# Patient Record
Sex: FEMALE | Race: WHITE | Employment: OTHER | URBAN - METROPOLITAN AREA
[De-identification: names, ages, dates, MRNs, and addresses within clinical notes are randomized per-mention and may not be internally consistent; named-entity substitution may affect disease eponyms.]

---

## 2017-01-17 PROCEDURE — 82787 IGG 1 2 3 OR 4 EACH: CPT | Performed by: INTERNAL MEDICINE

## 2017-01-26 PROBLEM — M32.8 OTHER FORMS OF SYSTEMIC LUPUS ERYTHEMATOSUS (HCC): Status: ACTIVE | Noted: 2017-01-26

## 2017-03-22 PROCEDURE — 82570 ASSAY OF URINE CREATININE: CPT | Performed by: INTERNAL MEDICINE

## 2017-03-22 PROCEDURE — 86332 IMMUNE COMPLEX ASSAY: CPT | Performed by: INTERNAL MEDICINE

## 2017-03-22 PROCEDURE — 81003 URINALYSIS AUTO W/O SCOPE: CPT | Performed by: INTERNAL MEDICINE

## 2017-03-22 PROCEDURE — 84156 ASSAY OF PROTEIN URINE: CPT | Performed by: INTERNAL MEDICINE

## 2017-05-08 PROCEDURE — 82523 COLLAGEN CROSSLINKS: CPT | Performed by: INTERNAL MEDICINE

## 2017-05-08 PROCEDURE — 84156 ASSAY OF PROTEIN URINE: CPT | Performed by: INTERNAL MEDICINE

## 2017-05-08 PROCEDURE — 82570 ASSAY OF URINE CREATININE: CPT | Performed by: INTERNAL MEDICINE

## 2017-05-08 PROCEDURE — 82787 IGG 1 2 3 OR 4 EACH: CPT | Performed by: INTERNAL MEDICINE

## 2017-05-23 ENCOUNTER — LAB ENCOUNTER (OUTPATIENT)
Dept: LAB | Age: 37
End: 2017-05-23
Payer: COMMERCIAL

## 2017-05-23 DIAGNOSIS — Z00.00 ROUTINE GENERAL MEDICAL EXAMINATION AT A HEALTH CARE FACILITY: Primary | ICD-10-CM

## 2017-05-23 PROCEDURE — 80048 BASIC METABOLIC PNL TOTAL CA: CPT

## 2017-06-28 PROCEDURE — 82787 IGG 1 2 3 OR 4 EACH: CPT | Performed by: INTERNAL MEDICINE

## 2017-07-19 PROCEDURE — 88305 TISSUE EXAM BY PATHOLOGIST: CPT | Performed by: INTERNAL MEDICINE

## 2017-08-21 PROCEDURE — 86480 TB TEST CELL IMMUN MEASURE: CPT | Performed by: INTERNAL MEDICINE

## 2017-08-21 PROCEDURE — 81003 URINALYSIS AUTO W/O SCOPE: CPT | Performed by: INTERNAL MEDICINE

## 2017-08-21 PROCEDURE — 80074 ACUTE HEPATITIS PANEL: CPT | Performed by: INTERNAL MEDICINE

## 2017-08-21 PROCEDURE — 84156 ASSAY OF PROTEIN URINE: CPT | Performed by: INTERNAL MEDICINE

## 2017-08-21 PROCEDURE — 82570 ASSAY OF URINE CREATININE: CPT | Performed by: INTERNAL MEDICINE

## 2017-08-22 PROBLEM — M32.19 OTHER SYSTEMIC LUPUS ERYTHEMATOSUS WITH OTHER ORGAN INVOLVEMENT (HCC): Status: ACTIVE | Noted: 2017-01-26

## 2017-09-26 PROCEDURE — 82787 IGG 1 2 3 OR 4 EACH: CPT | Performed by: INTERNAL MEDICINE

## 2017-09-29 PROBLEM — K14.6 BURNING TONGUE: Status: ACTIVE | Noted: 2017-09-29

## 2017-11-02 PROBLEM — G89.29 OTHER CHRONIC PAIN: Status: ACTIVE | Noted: 2017-11-02

## 2017-11-02 PROBLEM — K59.03 CONSTIPATION DUE TO OPIOID THERAPY: Status: ACTIVE | Noted: 2017-11-02

## 2017-11-02 PROBLEM — T40.2X5A CONSTIPATION DUE TO OPIOID THERAPY: Status: ACTIVE | Noted: 2017-11-02

## 2017-11-08 PROCEDURE — 84156 ASSAY OF PROTEIN URINE: CPT | Performed by: INTERNAL MEDICINE

## 2017-11-08 PROCEDURE — 82570 ASSAY OF URINE CREATININE: CPT | Performed by: INTERNAL MEDICINE

## 2017-11-08 PROCEDURE — 81003 URINALYSIS AUTO W/O SCOPE: CPT | Performed by: INTERNAL MEDICINE

## 2018-02-05 PROCEDURE — 81003 URINALYSIS AUTO W/O SCOPE: CPT | Performed by: INTERNAL MEDICINE

## 2018-04-11 PROCEDURE — 86235 NUCLEAR ANTIGEN ANTIBODY: CPT | Performed by: INTERNAL MEDICINE

## 2018-04-11 PROCEDURE — 81003 URINALYSIS AUTO W/O SCOPE: CPT | Performed by: INTERNAL MEDICINE

## 2018-04-11 PROCEDURE — 86480 TB TEST CELL IMMUN MEASURE: CPT | Performed by: INTERNAL MEDICINE

## 2018-04-11 PROCEDURE — 82787 IGG 1 2 3 OR 4 EACH: CPT | Performed by: INTERNAL MEDICINE

## 2018-04-11 PROCEDURE — 86038 ANTINUCLEAR ANTIBODIES: CPT | Performed by: INTERNAL MEDICINE

## 2018-05-14 PROBLEM — I10 ESSENTIAL HYPERTENSION, BENIGN: Status: ACTIVE | Noted: 2018-05-14

## 2018-05-31 PROBLEM — R76.8 POSITIVE ANA (ANTINUCLEAR ANTIBODY): Status: ACTIVE | Noted: 2018-05-31

## 2018-06-15 PROBLEM — I10 ESSENTIAL HYPERTENSION, BENIGN: Status: RESOLVED | Noted: 2018-05-14 | Resolved: 2018-06-15

## 2018-06-15 PROCEDURE — 84156 ASSAY OF PROTEIN URINE: CPT | Performed by: INTERNAL MEDICINE

## 2018-06-15 PROCEDURE — 82570 ASSAY OF URINE CREATININE: CPT | Performed by: INTERNAL MEDICINE

## 2018-06-15 PROCEDURE — 86480 TB TEST CELL IMMUN MEASURE: CPT | Performed by: INTERNAL MEDICINE

## 2018-06-15 PROCEDURE — 81003 URINALYSIS AUTO W/O SCOPE: CPT | Performed by: INTERNAL MEDICINE

## 2018-06-15 PROCEDURE — 82787 IGG 1 2 3 OR 4 EACH: CPT | Performed by: INTERNAL MEDICINE

## 2018-07-18 PROBLEM — J37.0 CHRONIC LARYNGITIS: Status: ACTIVE | Noted: 2018-07-18

## 2018-08-02 PROCEDURE — 81003 URINALYSIS AUTO W/O SCOPE: CPT | Performed by: INTERNAL MEDICINE

## 2018-08-02 PROCEDURE — 82570 ASSAY OF URINE CREATININE: CPT | Performed by: INTERNAL MEDICINE

## 2018-08-02 PROCEDURE — 82787 IGG 1 2 3 OR 4 EACH: CPT | Performed by: INTERNAL MEDICINE

## 2018-08-02 PROCEDURE — 84156 ASSAY OF PROTEIN URINE: CPT | Performed by: INTERNAL MEDICINE

## 2018-08-07 ENCOUNTER — OFFICE VISIT (OUTPATIENT)
Dept: SPEECH THERAPY | Age: 38
End: 2018-08-07
Attending: OTOLARYNGOLOGY
Payer: COMMERCIAL

## 2018-08-07 DIAGNOSIS — R49.0 MUSCLE TENSION DYSPHONIA: ICD-10-CM

## 2018-08-07 PROCEDURE — 92507 TX SP LANG VOICE COMM INDIV: CPT

## 2018-08-07 PROCEDURE — 92524 BEHAVRAL QUALIT ANALYS VOICE: CPT

## 2018-08-08 NOTE — PROGRESS NOTES
ADULT VOICE EVALUATION  Referring Physician: Dr. Tomas Eddy  Diagnosis: Muscular Tension Dysphonia R49.0     Date of Service: 8/8/2018     PATIENT SUMMARY  Katherleen Homans is a 34year old y/o female who presents to therapy today with complaints of Fatty Acids (OMEGA 3 OR) 3000mg daily  Disp:  Rfl:      No current facility-administered medications on file prior to visit.      VOICE HISTORY  Current Voice Diagnosis: Muscular tension dysphonia R49.0  Date of ENT Evaluation: 7/18/18  History of current c Aspiration    OBJECTIVE  VOCAL ANALYSIS (Consensus Auditory-Perceptual Evaluation of Voice (CAPE-V) Completed)  Overall Severity: Moderately Deviant  Roughness: Moderately Deviant  Breathiness:  Moderately Deviant  Strain: Mildly Deviant  Pitch: Mildly Margreta Conn and return this letter via fax as soon as possible to [phone].  If you have any questions, please contact me at Dept: [phone]    Sincerely,  Electronically signed by therapist: Tori Peace MS, CCC-SLP/L  Licensed Speech-Language Pathologist

## 2018-08-21 ENCOUNTER — OFFICE VISIT (OUTPATIENT)
Dept: SPEECH THERAPY | Age: 38
End: 2018-08-21
Attending: OTOLARYNGOLOGY
Payer: COMMERCIAL

## 2018-08-21 PROCEDURE — 92507 TX SP LANG VOICE COMM INDIV: CPT

## 2018-08-21 NOTE — PROGRESS NOTES
Treatment #1  Treatment Time: 60 minutes  Precautions:        Charges: 1 billed ([id number])   Pain: 0/10        Diagnosis: Dysphonia R49.0          Subjective: Patient attended therapy with her guide dog.  Reports improvement in her voice since the last session

## 2018-08-28 ENCOUNTER — APPOINTMENT (OUTPATIENT)
Dept: SPEECH THERAPY | Age: 38
End: 2018-08-28
Attending: OTOLARYNGOLOGY
Payer: COMMERCIAL

## 2018-09-11 ENCOUNTER — APPOINTMENT (OUTPATIENT)
Dept: SPEECH THERAPY | Age: 38
End: 2018-09-11
Attending: OTOLARYNGOLOGY
Payer: COMMERCIAL

## 2018-09-18 ENCOUNTER — APPOINTMENT (OUTPATIENT)
Dept: SPEECH THERAPY | Age: 38
End: 2018-09-18
Attending: OTOLARYNGOLOGY
Payer: COMMERCIAL

## 2018-09-19 ENCOUNTER — OFFICE VISIT (OUTPATIENT)
Dept: SPEECH THERAPY | Age: 38
End: 2018-09-19
Attending: OTOLARYNGOLOGY
Payer: COMMERCIAL

## 2018-09-19 PROCEDURE — 92507 TX SP LANG VOICE COMM INDIV: CPT

## 2018-09-19 NOTE — PROGRESS NOTES
Treatment #2  Treatment Time: 60 minutes  Precautions:        Charges: 1 billed ([id number])   Pain: 0/10        Diagnosis: Dysphonia R49.0          Subjective: Patient attended therapy with her guide dog.  Reports improvement in her voice since the last session

## 2018-09-21 PROBLEM — H51.11 BINOCULAR VISION DISORDER WITH CONVERGENCE INSUFFICIENCY: Status: ACTIVE | Noted: 2018-09-21

## 2018-09-21 PROBLEM — H52.203 HYPEROPIA OF BOTH EYES WITH ASTIGMATISM: Status: ACTIVE | Noted: 2018-09-21

## 2018-09-21 PROBLEM — H52.03 HYPEROPIA OF BOTH EYES WITH ASTIGMATISM: Status: ACTIVE | Noted: 2018-09-21

## 2018-09-21 PROBLEM — M32.9 LUPUS (SYSTEMIC LUPUS ERYTHEMATOSUS) (HCC): Status: ACTIVE | Noted: 2017-01-26

## 2018-09-21 PROBLEM — M35.01 SICCA SYNDROME WITH KERATOCONJUNCTIVITIS (HCC): Status: ACTIVE | Noted: 2018-09-21

## 2018-09-21 PROBLEM — Z79.899 LONG-TERM USE OF PLAQUENIL: Status: ACTIVE | Noted: 2018-09-21

## 2018-09-21 PROBLEM — H16.229 SICCA SYNDROME WITH KERATOCONJUNCTIVITIS: Status: ACTIVE | Noted: 2018-09-21

## 2018-09-28 ENCOUNTER — APPOINTMENT (OUTPATIENT)
Dept: SPEECH THERAPY | Age: 38
End: 2018-09-28
Attending: OTOLARYNGOLOGY
Payer: COMMERCIAL

## 2018-10-11 PROCEDURE — 84156 ASSAY OF PROTEIN URINE: CPT | Performed by: INTERNAL MEDICINE

## 2018-10-11 PROCEDURE — 82570 ASSAY OF URINE CREATININE: CPT | Performed by: INTERNAL MEDICINE

## 2018-10-11 PROCEDURE — 82787 IGG 1 2 3 OR 4 EACH: CPT | Performed by: INTERNAL MEDICINE

## 2018-10-11 PROCEDURE — 81003 URINALYSIS AUTO W/O SCOPE: CPT | Performed by: INTERNAL MEDICINE

## 2018-12-03 PROCEDURE — 81003 URINALYSIS AUTO W/O SCOPE: CPT | Performed by: INTERNAL MEDICINE

## 2018-12-03 PROCEDURE — 82570 ASSAY OF URINE CREATININE: CPT | Performed by: INTERNAL MEDICINE

## 2018-12-03 PROCEDURE — 84156 ASSAY OF PROTEIN URINE: CPT | Performed by: INTERNAL MEDICINE

## 2018-12-03 PROCEDURE — 82787 IGG 1 2 3 OR 4 EACH: CPT | Performed by: INTERNAL MEDICINE

## 2019-03-06 PROCEDURE — 81003 URINALYSIS AUTO W/O SCOPE: CPT | Performed by: INTERNAL MEDICINE

## 2019-03-06 PROCEDURE — 84156 ASSAY OF PROTEIN URINE: CPT | Performed by: INTERNAL MEDICINE

## 2019-03-06 PROCEDURE — 82787 IGG 1 2 3 OR 4 EACH: CPT | Performed by: INTERNAL MEDICINE

## 2019-04-16 PROCEDURE — 84156 ASSAY OF PROTEIN URINE: CPT | Performed by: INTERNAL MEDICINE

## 2019-04-16 PROCEDURE — 82787 IGG 1 2 3 OR 4 EACH: CPT | Performed by: INTERNAL MEDICINE

## 2019-04-16 PROCEDURE — 81003 URINALYSIS AUTO W/O SCOPE: CPT | Performed by: INTERNAL MEDICINE

## 2019-07-17 PROCEDURE — 81003 URINALYSIS AUTO W/O SCOPE: CPT | Performed by: INTERNAL MEDICINE

## 2019-07-17 PROCEDURE — 82787 IGG 1 2 3 OR 4 EACH: CPT | Performed by: INTERNAL MEDICINE

## 2019-07-17 PROCEDURE — 84156 ASSAY OF PROTEIN URINE: CPT | Performed by: INTERNAL MEDICINE

## 2019-08-19 PROCEDURE — 86038 ANTINUCLEAR ANTIBODIES: CPT | Performed by: INTERNAL MEDICINE

## 2019-08-19 PROCEDURE — 83516 IMMUNOASSAY NONANTIBODY: CPT | Performed by: INTERNAL MEDICINE

## 2019-08-19 PROCEDURE — 86235 NUCLEAR ANTIGEN ANTIBODY: CPT | Performed by: INTERNAL MEDICINE

## 2019-08-19 PROCEDURE — 86160 COMPLEMENT ANTIGEN: CPT | Performed by: INTERNAL MEDICINE

## 2019-08-19 PROCEDURE — 86800 THYROGLOBULIN ANTIBODY: CPT | Performed by: INTERNAL MEDICINE

## 2019-08-19 PROCEDURE — 86225 DNA ANTIBODY NATIVE: CPT | Performed by: INTERNAL MEDICINE

## 2019-08-19 PROCEDURE — 86376 MICROSOMAL ANTIBODY EACH: CPT | Performed by: INTERNAL MEDICINE

## 2019-08-19 PROCEDURE — 86161 COMPLEMENT/FUNCTION ACTIVITY: CPT | Performed by: INTERNAL MEDICINE

## 2020-01-21 PROBLEM — G50.0 TRIGEMINAL NEURALGIA: Status: ACTIVE | Noted: 2020-01-21

## 2020-02-24 ENCOUNTER — APPOINTMENT (OUTPATIENT)
Dept: BEHAVIORAL HEALTH | Age: 40
End: 2020-02-24

## 2020-03-04 RX ORDER — DEXTROAMPHETAMINE SACCHARATE, AMPHETAMINE ASPARTATE, DEXTROAMPHETAMINE SULFATE AND AMPHETAMINE SULFATE 2.5; 2.5; 2.5; 2.5 MG/1; MG/1; MG/1; MG/1
10 TABLET ORAL 2 TIMES DAILY
Qty: 60 TABLET | Refills: 0 | Status: SHIPPED | OUTPATIENT
Start: 2020-03-04 | End: 2020-03-30 | Stop reason: SDUPTHER

## 2020-03-08 PROBLEM — M79.671 FOOT PAIN, BILATERAL: Status: ACTIVE | Noted: 2020-03-08

## 2020-03-08 PROBLEM — Z79.899 HIGH RISK MEDICATION USE: Status: ACTIVE | Noted: 2020-03-08

## 2020-03-08 PROBLEM — M35.01 SJOGREN'S SYNDROME WITH KERATOCONJUNCTIVITIS SICCA (HCC): Status: ACTIVE | Noted: 2018-09-21

## 2020-03-08 PROBLEM — M25.541 ARTHRALGIA OF BOTH HANDS: Status: ACTIVE | Noted: 2020-03-08

## 2020-03-08 PROBLEM — M25.542 ARTHRALGIA OF BOTH HANDS: Status: ACTIVE | Noted: 2020-03-08

## 2020-03-08 PROBLEM — M79.672 FOOT PAIN, BILATERAL: Status: ACTIVE | Noted: 2020-03-08

## 2020-03-30 ENCOUNTER — HOSPITAL ENCOUNTER (OUTPATIENT)
Dept: BEHAVIORAL HEALTH | Age: 40
Discharge: STILL ADMITTED OR ADMINISTRATIVE | End: 2020-03-30
Attending: NURSE PRACTITIONER

## 2020-03-30 PROCEDURE — 99213 OFFICE O/P EST LOW 20 MIN: CPT | Performed by: NURSE PRACTITIONER

## 2020-03-30 RX ORDER — DEXTROAMPHETAMINE SACCHARATE, AMPHETAMINE ASPARTATE MONOHYDRATE, DEXTROAMPHETAMINE SULFATE AND AMPHETAMINE SULFATE 2.5; 2.5; 2.5; 2.5 MG/1; MG/1; MG/1; MG/1
10 CAPSULE, EXTENDED RELEASE ORAL DAILY
Status: DISCONTINUED | OUTPATIENT
Start: 2020-03-30 | End: 2020-04-01 | Stop reason: HOSPADM

## 2020-03-30 RX ORDER — DEXTROAMPHETAMINE SACCHARATE, AMPHETAMINE ASPARTATE, DEXTROAMPHETAMINE SULFATE AND AMPHETAMINE SULFATE 2.5; 2.5; 2.5; 2.5 MG/1; MG/1; MG/1; MG/1
TABLET ORAL
Qty: 60 TABLET | Refills: 0 | Status: SHIPPED | OUTPATIENT
Start: 2020-03-30 | End: 2020-03-30 | Stop reason: SDUPTHER

## 2020-03-30 RX ORDER — DEXTROAMPHETAMINE SACCHARATE, AMPHETAMINE ASPARTATE, DEXTROAMPHETAMINE SULFATE AND AMPHETAMINE SULFATE 2.5; 2.5; 2.5; 2.5 MG/1; MG/1; MG/1; MG/1
10 TABLET ORAL 2 TIMES DAILY
Qty: 60 TABLET | Refills: 0 | Status: SHIPPED | OUTPATIENT
Start: 2020-03-30 | End: 2020-09-21 | Stop reason: DRUGHIGH

## 2020-03-30 RX ORDER — ESCITALOPRAM OXALATE 20 MG/1
20 TABLET ORAL DAILY
COMMUNITY
End: 2020-07-06 | Stop reason: SDUPTHER

## 2020-03-30 ASSESSMENT — ENCOUNTER SYMPTOMS
APPETITE CHANGE: 1
FATIGUE: 1
SLEEP DISTURBANCE: 1

## 2020-07-06 ENCOUNTER — TELEPHONE (OUTPATIENT)
Dept: BEHAVIORAL HEALTH | Age: 40
End: 2020-07-06

## 2020-07-06 RX ORDER — ESCITALOPRAM OXALATE 20 MG/1
20 TABLET ORAL DAILY
Qty: 30 TABLET | Refills: 0 | Status: SHIPPED | OUTPATIENT
Start: 2020-07-06 | End: 2020-09-14 | Stop reason: SDUPTHER

## 2020-08-25 PROBLEM — M47.812 CERVICAL SPONDYLOSIS: Status: ACTIVE | Noted: 2020-08-25

## 2020-09-14 ENCOUNTER — TELEPHONE (OUTPATIENT)
Dept: BEHAVIORAL HEALTH | Age: 40
End: 2020-09-14

## 2020-09-14 RX ORDER — ESCITALOPRAM OXALATE 20 MG/1
20 TABLET ORAL DAILY
Qty: 30 TABLET | Refills: 0 | Status: SHIPPED | OUTPATIENT
Start: 2020-09-14 | End: 2020-11-02 | Stop reason: SDUPTHER

## 2020-09-21 ENCOUNTER — HOSPITAL ENCOUNTER (OUTPATIENT)
Dept: BEHAVIORAL HEALTH | Age: 40
Discharge: STILL ADMITTED OR ADMINISTRATIVE | End: 2020-09-21
Attending: NURSE PRACTITIONER

## 2020-09-21 PROCEDURE — 99213 OFFICE O/P EST LOW 20 MIN: CPT | Performed by: NURSE PRACTITIONER

## 2020-09-21 PROCEDURE — 90836 PSYTX W PT W E/M 45 MIN: CPT | Performed by: NURSE PRACTITIONER

## 2020-09-21 RX ORDER — BUSPIRONE HYDROCHLORIDE 7.5 MG/1
7.5 TABLET ORAL 2 TIMES DAILY
Qty: 60 TABLET | Refills: 3 | Status: SHIPPED | OUTPATIENT
Start: 2020-09-21 | End: 2020-11-12 | Stop reason: SDUPTHER

## 2020-09-21 RX ORDER — DEXTROAMPHETAMINE SACCHARATE, AMPHETAMINE ASPARTATE, DEXTROAMPHETAMINE SULFATE AND AMPHETAMINE SULFATE 1.25; 1.25; 1.25; 1.25 MG/1; MG/1; MG/1; MG/1
5 TABLET ORAL DAILY
Qty: 30 TABLET | Refills: 0 | Status: SHIPPED | OUTPATIENT
Start: 2020-09-21 | End: 2020-11-12 | Stop reason: SDUPTHER

## 2020-11-02 ENCOUNTER — TELEPHONE (OUTPATIENT)
Dept: BEHAVIORAL HEALTH | Age: 40
End: 2020-11-02

## 2020-11-02 RX ORDER — ESCITALOPRAM OXALATE 20 MG/1
20 TABLET ORAL DAILY
Qty: 30 TABLET | Refills: 0 | Status: SHIPPED | OUTPATIENT
Start: 2020-11-02 | End: 2020-11-30 | Stop reason: SDUPTHER

## 2020-11-12 ENCOUNTER — HOSPITAL ENCOUNTER (OUTPATIENT)
Dept: BEHAVIORAL HEALTH | Age: 40
Discharge: STILL ADMITTED OR ADMINISTRATIVE | End: 2020-11-12
Attending: NURSE PRACTITIONER

## 2020-11-12 PROCEDURE — 90836 PSYTX W PT W E/M 45 MIN: CPT | Performed by: NURSE PRACTITIONER

## 2020-11-12 PROCEDURE — 99213 OFFICE O/P EST LOW 20 MIN: CPT | Performed by: NURSE PRACTITIONER

## 2020-11-12 RX ORDER — DEXTROAMPHETAMINE SACCHARATE, AMPHETAMINE ASPARTATE, DEXTROAMPHETAMINE SULFATE AND AMPHETAMINE SULFATE 1.25; 1.25; 1.25; 1.25 MG/1; MG/1; MG/1; MG/1
5 TABLET ORAL 2 TIMES DAILY
Qty: 60 TABLET | Refills: 0 | Status: SHIPPED | OUTPATIENT
Start: 2020-11-12 | End: 2021-01-12 | Stop reason: SDUPTHER

## 2020-11-12 RX ORDER — BUSPIRONE HYDROCHLORIDE 7.5 MG/1
15 TABLET ORAL 2 TIMES DAILY
Qty: 60 TABLET | Refills: 3 | Status: SHIPPED | OUTPATIENT
Start: 2020-11-12 | End: 2021-01-12 | Stop reason: DRUGHIGH

## 2020-11-30 ENCOUNTER — TELEPHONE (OUTPATIENT)
Dept: BEHAVIORAL HEALTH | Age: 40
End: 2020-11-30

## 2020-11-30 RX ORDER — ESCITALOPRAM OXALATE 20 MG/1
20 TABLET ORAL DAILY
Qty: 30 TABLET | Refills: 0 | Status: SHIPPED | OUTPATIENT
Start: 2020-11-30 | End: 2021-01-11 | Stop reason: SDUPTHER

## 2021-01-11 ENCOUNTER — TELEPHONE (OUTPATIENT)
Dept: BEHAVIORAL HEALTH | Age: 41
End: 2021-01-11

## 2021-01-11 RX ORDER — ESCITALOPRAM OXALATE 20 MG/1
20 TABLET ORAL DAILY
Qty: 30 TABLET | Refills: 0 | Status: SHIPPED | OUTPATIENT
Start: 2021-01-11 | End: 2021-01-12 | Stop reason: SDUPTHER

## 2021-01-12 ENCOUNTER — HOSPITAL ENCOUNTER (OUTPATIENT)
Dept: BEHAVIORAL HEALTH | Age: 41
Discharge: STILL ADMITTED OR ADMINISTRATIVE | End: 2021-01-12
Attending: NURSE PRACTITIONER

## 2021-01-12 PROCEDURE — 90836 PSYTX W PT W E/M 45 MIN: CPT | Performed by: NURSE PRACTITIONER

## 2021-01-12 PROCEDURE — 99213 OFFICE O/P EST LOW 20 MIN: CPT | Performed by: NURSE PRACTITIONER

## 2021-01-12 RX ORDER — ESCITALOPRAM OXALATE 20 MG/1
20 TABLET ORAL DAILY
Qty: 30 TABLET | Refills: 3 | Status: SHIPPED | OUTPATIENT
Start: 2021-01-12 | End: 2021-05-28 | Stop reason: SDUPTHER

## 2021-01-12 RX ORDER — BUSPIRONE HYDROCHLORIDE 15 MG/1
15 TABLET ORAL 2 TIMES DAILY
Qty: 60 TABLET | Refills: 3 | Status: SHIPPED | OUTPATIENT
Start: 2021-01-12 | End: 2021-05-12 | Stop reason: SDUPTHER

## 2021-01-12 RX ORDER — DEXTROAMPHETAMINE SACCHARATE, AMPHETAMINE ASPARTATE, DEXTROAMPHETAMINE SULFATE AND AMPHETAMINE SULFATE 1.25; 1.25; 1.25; 1.25 MG/1; MG/1; MG/1; MG/1
5 TABLET ORAL 2 TIMES DAILY
Qty: 60 TABLET | Refills: 0 | Status: SHIPPED | OUTPATIENT
Start: 2021-01-12 | End: 2021-03-23 | Stop reason: SDUPTHER

## 2021-03-23 ENCOUNTER — TELEPHONE (OUTPATIENT)
Dept: BEHAVIORAL HEALTH | Age: 41
End: 2021-03-23

## 2021-03-23 RX ORDER — DEXTROAMPHETAMINE SACCHARATE, AMPHETAMINE ASPARTATE, DEXTROAMPHETAMINE SULFATE AND AMPHETAMINE SULFATE 1.25; 1.25; 1.25; 1.25 MG/1; MG/1; MG/1; MG/1
5 TABLET ORAL 2 TIMES DAILY
Qty: 60 TABLET | Refills: 0 | Status: SHIPPED | OUTPATIENT
Start: 2021-03-23 | End: 2022-08-03 | Stop reason: DRUGHIGH

## 2021-03-23 RX ORDER — DEXTROAMPHETAMINE SACCHARATE, AMPHETAMINE ASPARTATE, DEXTROAMPHETAMINE SULFATE AND AMPHETAMINE SULFATE 1.25; 1.25; 1.25; 1.25 MG/1; MG/1; MG/1; MG/1
5 TABLET ORAL DAILY
Qty: 30 TABLET | Refills: 0 | Status: SHIPPED | OUTPATIENT
Start: 2021-04-21 | End: 2022-08-03 | Stop reason: SDUPTHER

## 2021-03-23 RX ORDER — DEXTROAMPHETAMINE SACCHARATE, AMPHETAMINE ASPARTATE MONOHYDRATE, DEXTROAMPHETAMINE SULFATE AND AMPHETAMINE SULFATE 1.25; 1.25; 1.25; 1.25 MG/1; MG/1; MG/1; MG/1
5 CAPSULE, EXTENDED RELEASE ORAL DAILY
Qty: 30 CAPSULE | Refills: 0 | Status: SHIPPED | OUTPATIENT
Start: 2021-05-19 | End: 2022-08-03 | Stop reason: DRUGHIGH

## 2021-05-12 ENCOUNTER — TELEPHONE (OUTPATIENT)
Dept: BEHAVIORAL HEALTH | Age: 41
End: 2021-05-12

## 2021-05-12 RX ORDER — BUSPIRONE HYDROCHLORIDE 15 MG/1
15 TABLET ORAL 2 TIMES DAILY
Qty: 60 TABLET | Refills: 3 | Status: SHIPPED | OUTPATIENT
Start: 2021-05-12 | End: 2021-09-02 | Stop reason: SDUPTHER

## 2021-05-28 ENCOUNTER — TELEPHONE (OUTPATIENT)
Dept: BEHAVIORAL HEALTH | Age: 41
End: 2021-05-28

## 2021-05-28 RX ORDER — ESCITALOPRAM OXALATE 20 MG/1
20 TABLET ORAL DAILY
Qty: 30 TABLET | Refills: 3 | Status: SHIPPED | OUTPATIENT
Start: 2021-05-28 | End: 2021-08-26 | Stop reason: SDUPTHER

## 2021-08-26 ENCOUNTER — BEHAVIORAL HEALTH (OUTPATIENT)
Dept: BEHAVIORAL HEALTH | Age: 41
End: 2021-08-26

## 2021-08-26 DIAGNOSIS — F41.1 GENERALIZED ANXIETY DISORDER: Primary | ICD-10-CM

## 2021-08-26 PROCEDURE — 99214 OFFICE O/P EST MOD 30 MIN: CPT | Performed by: NURSE PRACTITIONER

## 2021-08-26 PROCEDURE — 90833 PSYTX W PT W E/M 30 MIN: CPT | Performed by: NURSE PRACTITIONER

## 2021-08-26 RX ORDER — ESCITALOPRAM OXALATE 20 MG/1
20 TABLET ORAL DAILY
Qty: 30 TABLET | Refills: 3 | Status: SHIPPED | OUTPATIENT
Start: 2021-08-26 | End: 2021-10-07 | Stop reason: SDUPTHER

## 2021-08-26 RX ORDER — ESCITALOPRAM OXALATE 5 MG/1
TABLET ORAL
Qty: 30 TABLET | Refills: 3 | Status: SHIPPED | OUTPATIENT
Start: 2021-08-26 | End: 2021-10-07 | Stop reason: SDUPTHER

## 2021-09-02 ENCOUNTER — TELEPHONE (OUTPATIENT)
Dept: BEHAVIORAL HEALTH | Age: 41
End: 2021-09-02

## 2021-09-02 RX ORDER — BUSPIRONE HYDROCHLORIDE 15 MG/1
15 TABLET ORAL 2 TIMES DAILY
Qty: 60 TABLET | Refills: 3 | Status: SHIPPED | OUTPATIENT
Start: 2021-09-02 | End: 2022-08-03

## 2021-10-07 ENCOUNTER — TELEPHONE (OUTPATIENT)
Dept: BEHAVIORAL HEALTH | Age: 41
End: 2021-10-07

## 2021-10-07 RX ORDER — ESCITALOPRAM OXALATE 20 MG/1
20 TABLET ORAL DAILY
Qty: 30 TABLET | Refills: 3 | Status: SHIPPED | OUTPATIENT
Start: 2021-10-07 | End: 2022-03-02 | Stop reason: SDUPTHER

## 2021-10-07 RX ORDER — ESCITALOPRAM OXALATE 5 MG/1
TABLET ORAL
Qty: 30 TABLET | Refills: 3 | Status: SHIPPED | OUTPATIENT
Start: 2021-10-07 | End: 2022-03-02 | Stop reason: SDUPTHER

## 2021-10-11 ENCOUNTER — TELEPHONE (OUTPATIENT)
Dept: BEHAVIORAL HEALTH | Age: 41
End: 2021-10-11

## 2021-11-01 ENCOUNTER — TELEPHONE (OUTPATIENT)
Dept: BEHAVIORAL HEALTH | Age: 41
End: 2021-11-01

## 2021-11-05 ENCOUNTER — TELEPHONE (OUTPATIENT)
Dept: BEHAVIORAL HEALTH | Age: 41
End: 2021-11-05

## 2022-03-02 ENCOUNTER — BEHAVIORAL HEALTH (OUTPATIENT)
Dept: BEHAVIORAL HEALTH | Age: 42
End: 2022-03-02

## 2022-03-02 DIAGNOSIS — F33.1 MAJOR DEPRESSIVE DISORDER, RECURRENT EPISODE, MODERATE (CMD): ICD-10-CM

## 2022-03-02 PROCEDURE — 99214 OFFICE O/P EST MOD 30 MIN: CPT | Performed by: NURSE PRACTITIONER

## 2022-03-02 PROCEDURE — 90833 PSYTX W PT W E/M 30 MIN: CPT | Performed by: NURSE PRACTITIONER

## 2022-03-02 RX ORDER — ESCITALOPRAM OXALATE 20 MG/1
20 TABLET ORAL DAILY
Qty: 90 TABLET | Refills: 3 | Status: SHIPPED | OUTPATIENT
Start: 2022-03-02 | End: 2023-03-13 | Stop reason: SDUPTHER

## 2022-03-02 RX ORDER — ESCITALOPRAM OXALATE 5 MG/1
TABLET ORAL
Qty: 90 TABLET | Refills: 3 | Status: SHIPPED | OUTPATIENT
Start: 2022-03-02 | End: 2023-03-13 | Stop reason: SDUPTHER

## 2022-08-03 ENCOUNTER — BEHAVIORAL HEALTH (OUTPATIENT)
Dept: BEHAVIORAL HEALTH | Age: 42
End: 2022-08-03

## 2022-08-03 DIAGNOSIS — F33.1 MAJOR DEPRESSIVE DISORDER, RECURRENT EPISODE, MODERATE (CMD): ICD-10-CM

## 2022-08-03 PROCEDURE — 99214 OFFICE O/P EST MOD 30 MIN: CPT | Performed by: NURSE PRACTITIONER

## 2022-08-03 PROCEDURE — 90833 PSYTX W PT W E/M 30 MIN: CPT | Performed by: NURSE PRACTITIONER

## 2022-08-03 RX ORDER — DEXTROAMPHETAMINE SACCHARATE, AMPHETAMINE ASPARTATE, DEXTROAMPHETAMINE SULFATE AND AMPHETAMINE SULFATE 1.25; 1.25; 1.25; 1.25 MG/1; MG/1; MG/1; MG/1
5 TABLET ORAL DAILY
Qty: 30 TABLET | Refills: 0 | Status: SHIPPED | OUTPATIENT
Start: 2022-08-03 | End: 2023-04-10 | Stop reason: CLARIF

## 2022-08-03 RX ORDER — DEXTROAMPHETAMINE SACCHARATE, AMPHETAMINE ASPARTATE, DEXTROAMPHETAMINE SULFATE AND AMPHETAMINE SULFATE 1.25; 1.25; 1.25; 1.25 MG/1; MG/1; MG/1; MG/1
5 TABLET ORAL DAILY
Qty: 30 TABLET | Refills: 0 | Status: SHIPPED | OUTPATIENT
Start: 2022-10-01 | End: 2023-04-10 | Stop reason: CLARIF

## 2022-08-03 RX ORDER — GABAPENTIN 300 MG/1
CAPSULE ORAL
Qty: 240 CAPSULE | Refills: 3 | Status: SHIPPED | OUTPATIENT
Start: 2022-08-03 | End: 2023-04-10 | Stop reason: CLARIF

## 2022-08-03 RX ORDER — DEXTROAMPHETAMINE SACCHARATE, AMPHETAMINE ASPARTATE, DEXTROAMPHETAMINE SULFATE AND AMPHETAMINE SULFATE 1.25; 1.25; 1.25; 1.25 MG/1; MG/1; MG/1; MG/1
5 TABLET ORAL DAILY
Qty: 30 TABLET | Refills: 0 | Status: SHIPPED | OUTPATIENT
Start: 2022-09-01 | End: 2023-04-10 | Stop reason: CLARIF

## 2023-03-13 RX ORDER — ESCITALOPRAM OXALATE 5 MG/1
TABLET ORAL
Qty: 90 TABLET | Refills: 3 | Status: SHIPPED | OUTPATIENT
Start: 2023-03-13

## 2023-03-13 RX ORDER — ESCITALOPRAM OXALATE 20 MG/1
20 TABLET ORAL DAILY
Qty: 90 TABLET | Refills: 3 | Status: SHIPPED | OUTPATIENT
Start: 2023-03-13

## 2023-03-14 ENCOUNTER — TELEPHONE (OUTPATIENT)
Dept: BEHAVIORAL HEALTH | Age: 43
End: 2023-03-14

## 2023-04-10 ENCOUNTER — BEHAVIORAL HEALTH (OUTPATIENT)
Dept: BEHAVIORAL HEALTH | Age: 43
End: 2023-04-10

## 2023-04-10 DIAGNOSIS — F33.41 RECURRENT MAJOR DEPRESSIVE DISORDER, IN PARTIAL REMISSION (CMD): Primary | ICD-10-CM

## 2023-04-10 PROCEDURE — 90833 PSYTX W PT W E/M 30 MIN: CPT | Performed by: NURSE PRACTITIONER

## 2023-04-10 PROCEDURE — 99214 OFFICE O/P EST MOD 30 MIN: CPT | Performed by: NURSE PRACTITIONER

## 2023-11-15 ENCOUNTER — TELEPHONE (OUTPATIENT)
Dept: BEHAVIORAL HEALTH | Age: 43
End: 2023-11-15

## 2024-04-29 RX ORDER — ESCITALOPRAM OXALATE 5 MG/1
TABLET ORAL
Qty: 90 TABLET | Refills: 0 | Status: SHIPPED | OUTPATIENT
Start: 2024-04-29 | End: 2024-04-30 | Stop reason: SDUPTHER

## 2024-04-29 RX ORDER — ESCITALOPRAM OXALATE 20 MG/1
20 TABLET ORAL DAILY
Qty: 90 TABLET | Refills: 0 | Status: SHIPPED | OUTPATIENT
Start: 2024-04-29 | End: 2024-04-30 | Stop reason: SDUPTHER

## 2024-04-30 ENCOUNTER — BEHAVIORAL HEALTH (OUTPATIENT)
Dept: BEHAVIORAL HEALTH | Age: 44
End: 2024-04-30

## 2024-04-30 DIAGNOSIS — F33.0 MAJOR DEPRESSIVE DISORDER, RECURRENT EPISODE, MILD (CMD): Primary | ICD-10-CM

## 2024-04-30 RX ORDER — ESCITALOPRAM OXALATE 5 MG/1
TABLET ORAL
Qty: 90 TABLET | Refills: 4 | Status: SHIPPED | OUTPATIENT
Start: 2024-04-30

## 2024-04-30 RX ORDER — ESCITALOPRAM OXALATE 20 MG/1
20 TABLET ORAL DAILY
Qty: 90 TABLET | Refills: 4 | Status: SHIPPED | OUTPATIENT
Start: 2024-04-30

## 2024-11-11 ENCOUNTER — APPOINTMENT (OUTPATIENT)
Dept: BEHAVIORAL HEALTH | Age: 44
End: 2024-11-11

## 2024-11-11 DIAGNOSIS — F33.1 MAJOR DEPRESSIVE DISORDER, RECURRENT EPISODE, MODERATE (CMD): ICD-10-CM

## 2024-11-11 DIAGNOSIS — F41.1 GENERALIZED ANXIETY DISORDER: Primary | ICD-10-CM

## 2024-11-11 PROCEDURE — 99214 OFFICE O/P EST MOD 30 MIN: CPT | Performed by: NURSE PRACTITIONER

## 2024-11-11 PROCEDURE — 90833 PSYTX W PT W E/M 30 MIN: CPT | Performed by: NURSE PRACTITIONER

## 2024-11-11 RX ORDER — BUPROPION HYDROCHLORIDE 150 MG/1
150 TABLET ORAL DAILY
Qty: 30 TABLET | Refills: 0 | Status: SHIPPED | OUTPATIENT
Start: 2024-11-11

## 2024-12-10 ENCOUNTER — APPOINTMENT (OUTPATIENT)
Age: 44
End: 2024-12-10

## 2024-12-10 DIAGNOSIS — F32.1 CURRENT MODERATE EPISODE OF MAJOR DEPRESSIVE DISORDER, UNSPECIFIED WHETHER RECURRENT  (CMD): Primary | ICD-10-CM

## 2024-12-10 RX ORDER — BUPROPION HYDROCHLORIDE 300 MG/1
300 TABLET ORAL DAILY
Qty: 90 TABLET | Refills: 1 | Status: SHIPPED | OUTPATIENT
Start: 2024-12-10

## 2025-02-25 NOTE — H&P (VIEW-ONLY)
Jaqueline Smith is a 45 year old female.   Patient presents with:  New Patient: Consult for port replacement for lupus treatment. Port-a-cath in place on right side. Inserted in 2015, last known working date in 2018.     HPI:    The patient presents for port placement.  The reason for the port is iv meds for lupus  Last used in 2018.    The patient has a   right sided port    The port was placed by    Dr. Rome Lott    in   2015    PCP: Elke Asif MD  Pt states she will be starting a new infusion monthly for SLE.  Pt was told by her rheum she should get a shingrix vaccine prior to getting vaccine.     Pt  has a port rt chest.  Has not been used since 2018.  Was placed 10 yrs ago.  Will be needing it for the infusions. Thinks needs a new port placed.    Rome Lott MD   Physician  Specialty: Surgery, General     Operative Report  Signed     Date of Service: 2/3/2015  1:20 PM     Signed       OPERATIVE REPORT                            Procedure Date: 2/3/2015  Patient Name: Jaqueline Smith  Preoperative Diagnosis: SLE  Postoperative Diagnosis: SLE  Primary Surgeon: Surgeon(s) and Role:     * Rome Lott MD - Primary  Assistant: None  Primary Procedure: right subclavian portacath placement (Bard 8F single lumen, low profile, power port) with flouroscopy  Additional Procedures: none  Indication: 35 yo woman with SLE requiring monthly access for biologic therapy.   Surgical Findings: See post-op diagnosis  Anesthesia: MAC   Local Anesthesia: 0.5% bupivacaine plain mixed 1:1 with 1% lidocaine with epinephrine:20cc  Complications: None  Estimated Blood Loss: 5cc                   Allergies:    Azathioprine            HIVES  Lamotrigine             RASH, ITCHING  Corn Syrup              NAUSEA AND VOMITING    Comment:Migraines & vomiting  Erythromycin            DIARRHEA, NAUSEA AND VOMITING   Current Meds:  Current Outpatient Medications   Medication Sig Dispense Refill   • pregabalin 100 MG Oral  Cap Take 1 capsule (100 mg total) by mouth 2 (two) times daily. 60 capsule 3   • LEVOTHYROXINE 25 MCG Oral Tab TAKE 1 TABLET(25 MCG) BY MOUTH DAILY 90 tablet 0   • BELBUCA 600 MCG Buccal Film      • buPROPion  MG Oral Tablet 24 Hr Take 300 mg by mouth daily.     • estradiol 1 MG Oral Tab Take 1 mg by mouth daily.     • hydroxychloroquine 200 MG Oral Tab Take 1 tablet (200 mg total) by mouth 2 (two) times daily. 180 tablet 3   • HYDROcodone-acetaminophen  MG Oral Tab Take 1 tablet by mouth every 8 (eight) hours as needed for Pain. 90 tablet 0   • belimumab (BENLYSTA) 200 MG/ML Subcutaneous Solution Auto-injector INJECT 200MG SUBCUTANEOUSLY  WEEKLY 12 each 1   • Digestive Enzymes (DIGESTIVE ENZYME OR) Take 1 tablet by mouth once daily.     • lisinopril 10 MG Oral Tab Take 1 tablet (10 mg total) by mouth daily. 90 tablet 3   • ONDANSETRON 4 MG Oral Tablet Dispersible DISSOLVE 1 TABLET(4 MG) ON THE TONGUE EVERY 8 HOURS AS NEEDED FOR NAUSEA 60 tablet 1   • escitalopram 5 MG Oral Tab      • cycloSPORINE (RESTASIS) 0.05 % Ophthalmic Emulsion Place 1 drop into both eyes 2 (two) times daily. 180 each 3   • escitalopram 20 MG Oral Tab Take 20 mg by mouth daily.     • Cholecalciferol (VITAMIN D3) 5000 UNITS Oral Cap Take  by mouth.     • Omega-3 Fatty Acids (OMEGA 3 OR) 3000mg daily      • hydrocortisone 2.5 % External Ointment Apply to the red area on the upper lip twice a day for up to 2 wks on, 1 wk break before restarting (Patient not taking: Reported on 2/26/2025) 30 g 2   • betamethasone 0.1 % External Cream Apply to affected areas twice daily up to two weeks with a one week break. Repeat as needed (Patient not taking: Reported on 2/26/2025) 45 g 2        HISTORY:  Past Medical History:   Diagnosis Date   • Convergence insufficiency    • COVID 11/2022   • Depression    • Disorder of thyroid    • Dry eye    • Endometriosis    • Hashimoto's thyroiditis 06/11/2015   • High blood pressure    • History of positive  PPD    • Hypertension     per patient resolved/was medication induced   • Lupus    • Neuropathy     triigeminal neuropathy   • Neurotrophic keratopathy of left eye    • Trigeminal neuralgia       Past Surgical History:   Procedure Laterality Date   • ADENOIDECTOMY     • CARDIAC CATHETERIZATION  2014   • COLONOSCOPY  2010    normal   • COLONOSCOPY  2017    normal   • EGD N/A 07/19/2017    Procedure: ESOPHAGOGASTRODUODENOSCOPY, COLONOSCOPY, POSSIBLE BIOPSY, POSSIBLE POLYPECTOMY 24906, 92890;  Surgeon: Aashish Santiago MD;  Location: [facility]   • EGD N/A 01/09/2023    Procedure: ESOPHAGOGASTRODUODENOSCOPY, with bxs COLONOSCOPY,;  Surgeon: Brent Sheets MD;  Location: [facility]   • FLUORO CENTRAL VENOUS ACCESS DEV PLACEMENT N/A 02/03/2015    Procedure: INSERTION PORT-A-CATH;  Surgeon: Rome Lott MD;  Location: [facility]   • HYSTERECTOMY  04/2016    CARLOS only.  has both ovaries   • INSJ TUNNELED CTR VAD W/SUBQ PORT AGE 5 YR/> N/A 02/03/2015    Procedure: INSERTION PORT-A-CATH;  Surgeon: Rome Lott MD;  Location: [facility]   • NASAL/SINUS ENDOSCOPY W/MAXILLARY ANTROSTOMY  03/19/2012    Procedure: ENDOSCOPY,  SINUS SEPTOPLASTY,ANTROSTOMIES;  Surgeon: Jude Starkey MD;  Location: [facility]   • NASAL/SINUS ENDOSCOPY W/MAXILLARY ANTROSTOMY  03/19/2012    Procedure: ENDOSCOPY,  SINUS SEPTOPLASTY,ANTROSTOMIES;  Surgeon: Jude Starkey MD;  Location: [facility]   • NASAL/SINUS NDSC W/PARTIAL ETHMOIDECTOMY  03/19/2012    Procedure: ENDOSCOPY,  SINUS SEPTOPLASTY,ANTROSTOMIES;  Surgeon: Jude Starkey MD;  Location: [facility]   • NASAL/SINUS NDSC W/PARTIAL ETHMOIDECTOMY  03/19/2012    Procedure: ENDOSCOPY,  SINUS SEPTOPLASTY,ANTROSTOMIES;  Surgeon: Jude Starkey MD;  Location: [facility]   • OTHER SURGICAL HISTORY      gyne laparascopy x2   • OTHER SURGICAL HISTORY  2014    cardiac cath   •  OTHER SURGICAL HISTORY  2017    EGD   • OTHER SURGICAL HISTORY Right     right ankle surgery with metal, 09/18/2023   • PORT A CATH ACCESS TOTAL Right    • SEPTOPLASTY/SUBMUCOUS RESECJ W/WO CARTILAGE GRF  03/19/2012    Procedure: ENDOSCOPY,  SINUS SEPTOPLASTY,ANTROSTOMIES;  Surgeon: Jude Starkey MD;  [facility]   • SINUS SURGERY       • TONSILLECTOMY     • TYMPANOSTOMY GENERAL ANESTHESIA     • UPPER GI ENDOSCOPY,EXAM        Family History   Problem Relation Age of Onset   • Cancer Maternal Grandmother         lung   • Other (Other) Paternal Grandmother         COPD   • Psychiatric Maternal Uncle         dementia   • Macular degeneration Neg    • Glaucoma Neg       Social History    Tobacco Use      Smoking status: Former        Types: Cigarettes      Smokeless tobacco: Never      Tobacco comments: quit smoking age 16, smoked for 1 yr    Vaping Use      Vaping status: Never Used    Alcohol use: Yes      Comment: social    Drug use: Yes      Types: Cannabis      Comment: CBD edible and patches every few weeks       ROS:     GENERAL HEALTH: otherwise feels well, no weight loss, no fever or chills  SKIN: denies any unusual skin rashes or jaundice  HEENT: denies nasal congestion, sinus pain or sore throat; hearing loss negative  RESPIRATORY: denies shortness of breath, wheezing or cough   CARDIOVASCULAR: denies chest pain or RICK; no palpitations   GI: denies nausea, vomiting, constipation, diarrhea; no rectal bleeding; no heartburn  GENITAL/: no dysuria, urgency or frequency, no tea colored urine  MUSCULOSKELETAL: no joint complaints upper or lower extremities  HEMATOLOGY: denies hx anemia; denies bruising or excessive bleeding        PHYSICAL EXAM:     General Appearance: in no acute distress, well developed, well nourished.female  Neck: soft, supple, nontender, the right external jugular vein is  visible.  Right-sided port in place catheter palpable to clavicle  Lymph Nodes: normal, no  cervical adenopathy, no palpable adenopathy.   Heart: no murmurs, regular rate and rhythm, S1, S2 normal.   Lungs: clear to auscultation bilaterally.   Abdomen: bowel sounds present, soft, nondistended, nontender, no hepatosplenomegaly, no inguinal hernia palpable.   Extremities: no clubbing, cyanosis, or edema.  Skin: normal.  Neuro: intact         ASSESSMENT/ PLAN:   This is an 45 year old female who has a right subclavian vein port in place.  This has been nonfunctional since 2018.  Patient now requires vascular access for new lupus IV drug.  An extensive discussion with the patient his etiology of the above.  Reviewed chest x-rays reveal catheter in good position.  The catheter was subclavian vein approach. I discussed options of wiring a new catheter over the previous catheter and replacing the reservoir on the right side.  Also discussed possibility of some phallic vein catheter placement internal jugular vein catheter placement. Also discussed concerns for inability place catheter in the right side if there is evidence for simply vein thrombosis and the need for catheter placed on the right side.  I discussed the needs a port insertion for vascular access for IV lupus medication. I had a length discussion with the patient as to the need for port placement.  Discussed the new rapid infusion ct ports with the patient, and the advantages.  Risks of the procedure including bleeding, infection, non functional port, infected port,  need for revision of the port, hemothorax, pneumothorax, subclavian or jugular vein thrombosis; as well as the risks with anesthesia including but not limited to myocardial infarction, respiratory failure, renal failure, pulmonary embolism, DVT, CVA, and even death were all discussed in detail with the patient, who understands, consents, and wishes to proceed with surgery.  I we will plan revision and possible replacement of subcutaneous port with reservoir  placement with fluoroscopic  supervision and possible ultrasound guidance, under general at [facility] on Thursday, March 6, 2025. I reviewed patient education material with the patient.    Patient must have surgery at [facility].  C-arm is not available next week at the surgery center by surgical dates..  I spent 45  minutes on this patient visit. This included: preparing to see patient, obtaining history, reviewing separately obtained history, performing physical examination, independently interpreting results and discussing with patient, patient and family counseling, referring and communicating with other healthcare professionals, and care coordination  Elke Asif MD

## 2025-03-04 RX ORDER — LISINOPRIL 10 MG/1
10 TABLET ORAL DAILY
COMMUNITY
Start: 2023-09-12

## 2025-03-04 RX ORDER — BETAMETHASONE DIPROPIONATE 0.5 MG/G
1 CREAM TOPICAL 2 TIMES DAILY PRN
COMMUNITY

## 2025-03-04 RX ORDER — PREGABALIN 200 MG/1
200 CAPSULE ORAL NIGHTLY
COMMUNITY

## 2025-03-04 RX ORDER — HYDROCODONE BITARTRATE AND ACETAMINOPHEN 10; 325 MG/1; MG/1
1 TABLET ORAL EVERY 8 HOURS PRN
COMMUNITY
Start: 2025-01-28

## 2025-03-05 NOTE — DISCHARGE INSTRUCTIONS
Dr. Jarred Leos   DISCHARGE INSTRUCTIONS PORT INSERTION         Activity can be resumed as tolerated including walking, stairs, light exercise and driving short distances.  Heavy lifting (i.e. objects > 15-20 lbs.) is to be avoided for 1-2 weeks.  Normal time off work is 2-5 days.    Incisional  pains are commonly of moderate intensity in the first 24-48 hours and gradually improve over the initial post-operative week.  Please take tylenol extra strength 2   325 mg tabs every 8 hours around the clock.  Also take  Aleve 1-2 tablets every 12 hours around the clock.   Prescription pain medication (norco) should be used initially according to the pain experienced and gradually weaned over the next few days.  Please be aware that the prescription pain medication contains the equivalent to 1 dose of Tylenol. Do not exceed 3000mg of Tylenol in a 24 hour period.  Prescription pain medication can make you nauseated, light-headed and constipated: it should be taken with food and discontinued if side-effects develop.  A prescription for  stool softener  (Colace) is helpful to avoid constipation. Take 1 orally twice a day.   If  no bowel movement within 48 hours, MOM 30 mls may be helpful.    Your diet should consist primarily of liquids until you have a good appetite, usually the first day after surgery.  Fatty or fried foods should be avoided in the first week or two after surgery but subsequently a general diet may be resumed.    The dressing should not be removed until the first office visit with oncology nursing .  You may shower in 24 hours, but no baths or swimming for 4 weeks from your surgery.  Apply an ice bag over your dressing for 72 hours.  No driving for 5 days or until off pain medication.  If the gauze dressing gets wet remove the Tegaderm and gauze but leave the Steri-Strips in place.       May use port today.     Activity after surgery  After surgery, take it easy for the rest of the day. If you had  general anesthesia, don’t use machinery or power tools, drink alcohol, or make any major decisions for at least the first 24 hours.  Don’t drive while you are still taking opioid pain medication, and don’t drive u.ntil you are able to step firmly on the brake pedal without hesitation.  Ask others to help with chores and errands while you recover.  Don’t lift anything heavier than 10 pounds until your doctor says it’s OK.  Don’t mow the lawn, shovel snow, use a vacuum , or do other strenuous activities until your doctor says it’s okay.  Walk as often as you feel able.  Continue the coughing and deep breathing exercises that you learned in the hospital.  Ask your doctor when you can expect to return to work.  Avoid constipation:  Eat fruits, vegetables, and whole grains   Drink 6-8 glasses of water a day, unless otherwise instructed.  Use a laxative or a mild stool softener as instructed by your doctor.      If you have any questions or problems such as  Swelling, oozing, worsening pain, or unusual redness around the incision  Fever of 101.5°F (38.5°C) or higher  Increasing abdominal pain, poor appetite  Severe diarrhea, bloating, or constipation  Nausea or vomiting     Call your doctor immediately  by contacting  the office or the 24-hour answering service.    Instructions given by BRYANNA Dejesus MD. [facility]  GENERAL SURGERY  [facility]  OFFICE [phone]    3/6/2025  1:05 PM           HOME INSTRUCTIONS  [facility] HOME CARE INSTRUCTIONS: POST-OP ANESTHESIA  The medication that you received for sedation or general anesthesia can last up to 24 hours. Your judgment and reflexes may be altered, even if you feel like your normal self.      We Recommend:   Do not drive any motor vehicle or bicycle   Avoid mowing the lawn, playing sports, or working with power tools/applicances (power saws, electric knives or mixers)   That you have someone stay with you on your first night home    Do not drink alcohol or take sleeping pills or tranquilizers   Do not sign legal documents within 24 hours of your procedure   If you had a nerve block for your surgery, take extra care not to put any pressure on your arm or hand for 24 hours    It is normal:  For you to have a sore throat if you had a breathing tube during surgery (while you were asleep!). The sore throat should get better within 48 hours. You can gargle with warm salt water (1/2 tsp in 4 oz warm water) or use a throat lozenge for comfort  To feel muscle aches or soreness especially in the abdomen, chest or neck. The achy feeling should go away in the next 24 hours  To feel weak, sleepy or \"wiped out\". Your should start feeling better in the next 24 hours.   To experience mild discomforts such as sore lip or tongue, headache, cramps, gas pains or a bloated feeling in your abdomen.   To experience mild back pain or soreness for a day or two if you had spinal or epidural anesthesia.   If you had laparoscopic surgery, to feel shoulder pain or discomfort on the day of surgery.   For some patients to have nausea after surgery/anesthesia    If you feel nausea or experience vomiting:   Try to move around less.   Eat less than usual or drink only liquids until the next morning   Nausea should resolve in about 24 hours    If you have a problem when you are at home:    Call your surgeons office   Discharge Instructions: After Your Surgery  You’ve just had surgery. During surgery, you were given medicine called anesthesia to keep you relaxed and free of pain. After surgery, you may have some pain or nausea. This is common. Here are some tips for feeling better and getting well after surgery.   Going home  Your healthcare provider will show you how to take care of yourself when you go home. They'll also answer your questions. Have an adult family member or friend drive you home. For the first 24 hours after your surgery:   Don't drive or use heavy  equipment.  Don't make important decisions or sign legal papers.  Take medicines as directed.  Don't drink alcohol.  Have someone stay with you, if needed. They can watch for problems and help keep you safe.  Be sure to go to all follow-up visits with your healthcare provider. And rest after your surgery for as long as your provider tells you to.   Coping with pain  If you have pain after surgery, pain medicine will help you feel better. Take it as directed, before pain becomes severe. Also, ask your healthcare provider or pharmacist about other ways to control pain. This might be with heat, ice, or relaxation. And follow any other instructions your surgeon or nurse gives you.      Stay on schedule with your medicine.     Tips for taking pain medicine  To get the best relief possible, remember these points:   Pain medicines can upset your stomach. Taking them with a little food may help.  Most pain relievers taken by mouth need at least 20 to 30 minutes to start to work.  Don't wait till your pain becomes severe before you take your medicine. Try to time your medicine so that you can take it before starting an activity. This might be before you get dressed, go for a walk, or sit down for dinner.  Constipation is a common side effect of some pain medicines. Call your healthcare provider before taking any medicines such as laxatives or stool softeners to help ease constipation. Also ask if you should skip any foods. Drinking lots of fluids and eating foods such as fruits and vegetables that are high in fiber can also help. Remember, don't take laxatives unless your surgeon has prescribed them.  Drinking alcohol and taking pain medicine can cause dizziness and slow your breathing. It can even be deadly. Don't drink alcohol while taking pain medicine.  Pain medicine can make you react more slowly to things. Don't drive or run machinery while taking pain medicine.  Your healthcare provider may tell you to take  acetaminophen to help ease your pain. Ask them how much you're supposed to take each day. Acetaminophen or other pain relievers may interact with your prescription medicines or other over-the-counter (OTC) medicines. Some prescription medicines have acetaminophen and other ingredients in them. Using both prescription and OTC acetaminophen for pain can cause you to accidentally overdose. Read the labels on your OTC medicines with care. This will help you to clearly know the list of ingredients, how much to take, and any warnings. It may also help you not take too much acetaminophen. If you have questions or don't understand the information, ask your pharmacist or healthcare provider to explain it to you before you take the OTC medicine.   Managing nausea  Some people have an upset stomach (nausea) after surgery. This is often because of anesthesia, pain, or pain medicine, less movement of food in the stomach, or the stress of surgery. These tips will help you handle nausea and eat healthy foods as you get better. If you were on a special food plan before surgery, ask your healthcare provider if you should follow it while you get better. Check with your provider on how your eating should progress. It may depend on the surgery you had. These general tips may help:   Don't push yourself to eat. Your body will tell you when to eat and how much.  Start off with clear liquids and soup. They're easier to digest.  Next try semi-solid foods as you feel ready. These include mashed potatoes, applesauce, and gelatin.  Slowly move to solid foods. Don’t eat fatty, rich, or spicy foods at first.  Don't force yourself to have 3 large meals a day. Instead eat smaller amounts more often.  Take pain medicines with a small amount of solid food, such as crackers or toast. This helps prevent nausea.  When to call your healthcare provider  Call your healthcare provider right away if you have any of these:   You still have too much pain, or  the pain gets worse, after taking the medicine. The medicine may not be strong enough. Or there may be a complication from the surgery.  You feel too sleepy, dizzy, or groggy. The medicine may be too strong.  Side effects such as nausea or vomiting. Your healthcare provider may advise taking other medicines to .  Skin changes such as rash, itching, or hives. This may mean you have an allergic reaction. Your provider may advise taking other medicines.  The incision looks different (for instance, part of it opens up).  Bleeding or fluid leaking from the incision site, and weren't told to expect that.  Fever of 100.4°F (38°C) or higher, or as directed by your provider.  Call 911  Call 911 right away if you have:   Trouble breathing  Facial swelling    If you have obstructive sleep apnea   You were given anesthesia medicine during surgery to keep you comfortable and free of pain. After surgery, you may have more apnea spells because of this medicine and other medicines you were given. The spells may last longer than normal.    At home:  Keep using the continuous positive airway pressure (CPAP) device when you sleep. Unless your healthcare provider tells you not to, use it when you sleep, day or night. CPAP is a common device used to treat obstructive sleep apnea.  Talk with your provider before taking any pain medicine, muscle relaxants, or sedatives. Your provider will tell you about the possible dangers of taking these medicines.  Contact your provider if your sleeping changes a lot even when taking medicines as directed.  [employer] last reviewed this educational content on 10/1/2021  © 3110-6627 [employer]. All rights reserved. This information is not intended as a substitute for professional medical care. Always follow your healthcare professional's instructions.

## 2025-03-06 ENCOUNTER — HOSPITAL ENCOUNTER (OUTPATIENT)
Facility: HOSPITAL | Age: 45
Setting detail: HOSPITAL OUTPATIENT SURGERY
Discharge: HOME | End: 2025-03-06
Attending: SURGERY | Admitting: SURGERY
Payer: COMMERCIAL

## 2025-03-06 ENCOUNTER — APPOINTMENT (OUTPATIENT)
Dept: GENERAL RADIOLOGY | Facility: HOSPITAL | Age: 45
End: 2025-03-06
Attending: SURGERY
Payer: COMMERCIAL

## 2025-03-06 ENCOUNTER — ANESTHESIA EVENT (OUTPATIENT)
Dept: SURGERY | Facility: HOSPITAL | Age: 45
End: 2025-03-06
Payer: COMMERCIAL

## 2025-03-06 ENCOUNTER — ANESTHESIA (OUTPATIENT)
Dept: SURGERY | Facility: HOSPITAL | Age: 45
End: 2025-03-06
Payer: COMMERCIAL

## 2025-03-06 VITALS
HEART RATE: 89 BPM | TEMPERATURE: 98 F | DIASTOLIC BLOOD PRESSURE: 87 MMHG | BODY MASS INDEX: 26.68 KG/M2 | SYSTOLIC BLOOD PRESSURE: 131 MMHG | WEIGHT: 166 LBS | RESPIRATION RATE: 16 BRPM | OXYGEN SATURATION: 93 % | HEIGHT: 66 IN

## 2025-03-06 PROCEDURE — 77001 FLUOROGUIDE FOR VEIN DEVICE: CPT | Performed by: SURGERY

## 2025-03-06 PROCEDURE — 71045 X-RAY EXAM CHEST 1 VIEW: CPT | Performed by: SURGERY

## 2025-03-06 PROCEDURE — 0J2TXYZ CHANGE OTHER DEVICE IN TRUNK SUBCUTANEOUS TISSUE AND FASCIA, EXTERNAL APPROACH: ICD-10-PCS | Performed by: SURGERY

## 2025-03-06 DEVICE — POWERPORT ISP M.R.I. IMPLANTABLE PORT WITH ATTACHABLE 8F CHRONOFLEX OPEN-ENDED SINGLE-LUMEN VENOUS CATHETER INTERMEDIATE KIT (WITH SUTURE-PLUGS)
Type: IMPLANTABLE DEVICE | Site: CHEST | Status: FUNCTIONAL
Brand: POWERPORT M.R.I., CHRONOFLEX

## 2025-03-06 RX ORDER — MORPHINE SULFATE 4 MG/ML
2 INJECTION, SOLUTION INTRAMUSCULAR; INTRAVENOUS EVERY 10 MIN PRN
Status: DISCONTINUED | OUTPATIENT
Start: 2025-03-06 | End: 2025-03-06

## 2025-03-06 RX ORDER — MORPHINE SULFATE 4 MG/ML
4 INJECTION, SOLUTION INTRAMUSCULAR; INTRAVENOUS EVERY 10 MIN PRN
Status: DISCONTINUED | OUTPATIENT
Start: 2025-03-06 | End: 2025-03-06

## 2025-03-06 RX ORDER — HYDROMORPHONE HYDROCHLORIDE 1 MG/ML
0.6 INJECTION, SOLUTION INTRAMUSCULAR; INTRAVENOUS; SUBCUTANEOUS EVERY 5 MIN PRN
Status: DISCONTINUED | OUTPATIENT
Start: 2025-03-06 | End: 2025-03-06

## 2025-03-06 RX ORDER — MORPHINE SULFATE 10 MG/ML
6 INJECTION, SOLUTION INTRAMUSCULAR; INTRAVENOUS EVERY 10 MIN PRN
Status: DISCONTINUED | OUTPATIENT
Start: 2025-03-06 | End: 2025-03-06

## 2025-03-06 RX ORDER — ACETAMINOPHEN 500 MG
1000 TABLET ORAL ONCE
Status: COMPLETED | OUTPATIENT
Start: 2025-03-06 | End: 2025-03-06

## 2025-03-06 RX ORDER — LABETALOL HYDROCHLORIDE 5 MG/ML
5 INJECTION, SOLUTION INTRAVENOUS EVERY 5 MIN PRN
Status: DISCONTINUED | OUTPATIENT
Start: 2025-03-06 | End: 2025-03-06

## 2025-03-06 RX ORDER — DEXAMETHASONE SODIUM PHOSPHATE 4 MG/ML
VIAL (ML) INJECTION AS NEEDED
Status: DISCONTINUED | OUTPATIENT
Start: 2025-03-06 | End: 2025-03-06 | Stop reason: SURG

## 2025-03-06 RX ORDER — HYDROMORPHONE HYDROCHLORIDE 1 MG/ML
0.4 INJECTION, SOLUTION INTRAMUSCULAR; INTRAVENOUS; SUBCUTANEOUS EVERY 5 MIN PRN
Status: DISCONTINUED | OUTPATIENT
Start: 2025-03-06 | End: 2025-03-06

## 2025-03-06 RX ORDER — NALOXONE HYDROCHLORIDE 0.4 MG/ML
0.08 INJECTION, SOLUTION INTRAMUSCULAR; INTRAVENOUS; SUBCUTANEOUS AS NEEDED
Status: DISCONTINUED | OUTPATIENT
Start: 2025-03-06 | End: 2025-03-06

## 2025-03-06 RX ORDER — LIDOCAINE HYDROCHLORIDE 10 MG/ML
INJECTION, SOLUTION EPIDURAL; INFILTRATION; INTRACAUDAL; PERINEURAL AS NEEDED
Status: DISCONTINUED | OUTPATIENT
Start: 2025-03-06 | End: 2025-03-06 | Stop reason: SURG

## 2025-03-06 RX ORDER — HYDROMORPHONE HYDROCHLORIDE 1 MG/ML
0.2 INJECTION, SOLUTION INTRAMUSCULAR; INTRAVENOUS; SUBCUTANEOUS EVERY 5 MIN PRN
Status: DISCONTINUED | OUTPATIENT
Start: 2025-03-06 | End: 2025-03-06

## 2025-03-06 RX ORDER — BUPIVACAINE HYDROCHLORIDE AND EPINEPHRINE 2.5; 5 MG/ML; UG/ML
INJECTION, SOLUTION INFILTRATION; PERINEURAL AS NEEDED
Status: DISCONTINUED | OUTPATIENT
Start: 2025-03-06 | End: 2025-03-06 | Stop reason: HOSPADM

## 2025-03-06 RX ORDER — MIDAZOLAM HYDROCHLORIDE 1 MG/ML
INJECTION INTRAMUSCULAR; INTRAVENOUS AS NEEDED
Status: DISCONTINUED | OUTPATIENT
Start: 2025-03-06 | End: 2025-03-06 | Stop reason: SURG

## 2025-03-06 RX ORDER — SODIUM CHLORIDE, SODIUM LACTATE, POTASSIUM CHLORIDE, CALCIUM CHLORIDE 600; 310; 30; 20 MG/100ML; MG/100ML; MG/100ML; MG/100ML
INJECTION, SOLUTION INTRAVENOUS CONTINUOUS
Status: DISCONTINUED | OUTPATIENT
Start: 2025-03-06 | End: 2025-03-06

## 2025-03-06 RX ORDER — ONDANSETRON 2 MG/ML
INJECTION INTRAMUSCULAR; INTRAVENOUS AS NEEDED
Status: DISCONTINUED | OUTPATIENT
Start: 2025-03-06 | End: 2025-03-06 | Stop reason: SURG

## 2025-03-06 RX ADMIN — DEXAMETHASONE SODIUM PHOSPHATE 8 MG: 4 MG/ML VIAL (ML) INJECTION at 13:52:00

## 2025-03-06 RX ADMIN — LIDOCAINE HYDROCHLORIDE 50 MG: 10 INJECTION, SOLUTION EPIDURAL; INFILTRATION; INTRACAUDAL; PERINEURAL at 13:35:00

## 2025-03-06 RX ADMIN — MIDAZOLAM HYDROCHLORIDE 2 MG: 1 INJECTION INTRAMUSCULAR; INTRAVENOUS at 13:32:00

## 2025-03-06 RX ADMIN — ONDANSETRON 4 MG: 2 INJECTION INTRAMUSCULAR; INTRAVENOUS at 14:33:00

## 2025-03-06 NOTE — ANESTHESIA PROCEDURE NOTES
Airway  Date/Time: 3/6/2025 1:37 PM  Urgency: Elective      General Information and Staff    Patient location during procedure: OR  Anesthesiologist: Teo Cuadra MD  Performed: anesthesiologist   Performed by: Teo Cuadra MD  Authorized by: Teo Cuadra MD      Indications and Patient Condition  Indications for airway management: anesthesia  Sedation level: deep  Preoxygenated: yes  Patient position: sniffing  Mask difficulty assessment: 1 - vent by mask    Final Airway Details  Final airway type: supraglottic airway      Successful airway: classic  Size 3       Number of attempts at approach: 1

## 2025-03-06 NOTE — OPERATIVE REPORT
[facility] OPERATING ROOM OPERATIVE REPORT:     PATIENT NAME: Jaqueline Smith  : 1980   MRN: [id number]  SITE: [place]    DATE OF OPERATION:   25    PREOPERATIVE DIAGNOSIS: Nonfunctional port  Need for IV access for lupus medication     POSTOPERATIVE DIAGNOSIS: Same nonfunctional port need for IV access for lupus medication     PROCEDURE PERFORMED: Removal of subcutaneous port with insertion of a new subcutaneous port with reservoir centrally with intraoperative fluoroscopy supervision and guidance     SURGEON:  Jarred Leos MD    Assistant: Gary Villatoro PA-C  (Assistant helped position patient and helped with positioning, retraction, suturing, closure, dressings etc.)      ANESTHESIA: General    ESTIMATED BLOOD LOSS:   Less than 5 ml    COMPLICATIONS: none    INDICATIONS:  This is a 45 year old female  with evidence of nonfunctional port requiring vascular access for new lupus medication.  Patient's previous port was used up to 10 years ago and has not been used in over 10 years.  Patient now needs removal of old port with insertion of a completely new port due to poor vascular access.  The risks of the procedure including bleeding, infection, postoperative hematoma, wound infection, nonhealing wound, scar formation, hemothorax, pneumothorax, subclavian vein thrombosis, infected catheter, nonfunctioning catheter, need for revision of catheter, as well as risks with anesthesia were discussed in detail with the patient as well as her  who both understand, consent and wish to proceed with the operation.     OPERATION:  The patient was brought to the operating room and placed on the operating room table in the supine position. General   anesthetic was administered  by Anesthesia. The patient's right neck and chest were prepped and draped in routine sterile fashion. Then, 0.25% Marcaine with epinephrine was used to anesthetize the skin overlying the  right sided port.   A  transverse incision was made overlying the right sided port made with a #10 blade.  Dissection continued down through the subcutaneous tissue with electric cautery.  The port reservoir was identified and this was freed up with electrocautery.  The catheter was freed up circumferentially.  The sutures of 0 Prolene were removed and all 3 corners.  There was were then brought out through the incision.  At this time the port catheter was clamped with a hemostat and the reservoir was removed off the field.  Using a glide wire this was advanced to the port catheter without difficulty.  Fluoroscopic supervision revealed Glidewire within the right atrium Superior vena caval junction.  At this time the previous port catheter was completely removed.  Next the peel-away sheath was placed over the Glidewire under direct fluoroscopic supervision.  The Glidewire was inserted without difficulty and good position.  Next the Glidewire and the dilator removed without difficulty.  A new port catheter was placed through the peel-away sheath felt the tip was in the superior vena cava right atrial junction.  This was confirmed under fluoroscopic supervision.  At this time the peel-away sheath was removed without difficulty.  The catheter was in good position.  The pocket was made in the subcutaneous tissue at the previous port site with electrocautery.  The area was defatted.  The reservoir was placed in the pocket without difficulty.  The catheter was trimmed to the appropriate length and attached to the reservoir with a locking device.  The port was aspirated irrigated-same solution without difficulty.  There was excellent flow.  There was was attached to the chest wall and all 3 corners with 2-0 Prolene simple interrupted suture.  Final fluoroscopic supervision revealed the catheter in good position without evidence of pneumothorax or hemothorax present.   Again, the catheter was placed centrally. There was no evidence of hemothorax  or pneumothorax present. The wound was irrigated thoroughly with saline solution. Subcutaneous tissue was approximated with 3-0 Vicryl simple interrupted sutures. The skin was approximated with 4-0 subcuticular suture. Steri-Strips were applied to the wound. Sterile dressing was applied to the wound. The catheter was cannulated with a Glasgow needle, aspirated and flushed with Heparin and saline solution the port was not accessed for long-term use.  The patient was transferred off the operating room table to the recovery room extubated and in stable condition. All counts were correct at the end of the case. The port may be used at this time. The patient may require a longer needle for a port access. The port may be used at this time.         AGUSTINA GLEZ JR., MD. [facility]  GENERAL SURGERY  [facility]    3/6/2025  2:41 PM  Elke Asif MD

## 2025-03-06 NOTE — BRIEF OP NOTE
Pre-Operative Diagnosis: Port a catheter in place, problem with vascular abcess     Post-Operative Diagnosis: Port a catheter in place, problem with vascular abcess      Procedure Performed:   Revision and complete Replacement of subcutaneous port-a-catheter with reservoir, right subclavian under fluoroscopic supervision    Surgeons and Role:     * Jarred Leos MD - Primary    Assistant(s):  PA: Gary Villatoro PA     Surgical Findings: Nonfunctional port     Specimen: None     Estimated Blood Loss: Less than 5 mL    Dictation Number:      Jarred Leos MD  3/6/2025  2:39 PM

## 2025-03-06 NOTE — ANESTHESIA POSTPROCEDURE EVALUATION
Patient: Jaqueline Smith    Procedure Summary       Date: 03/06/25 Room / Location: [facility] MAIN OR 07 / [facility] MAIN OR    Anesthesia Start: 1332 Anesthesia Stop: 1450    Procedure: Revision and complete Replacement of subcutaneous port-a-catheter with reservoir, right subclavian under fluoroscopic supervision (Chest) Diagnosis: (Port a catheter in place, problem with vascular abcess)    Surgeons: Jarred Leos MD Anesthesiologist: Teo Cuadra MD    Anesthesia Type: general ASA Status: 2            Anesthesia Type: general    Vitals Value Taken Time   /87 03/06/25 1529   Temp 97.5 °F (36.4 °C) 03/06/25 1520   Pulse 89 03/06/25 1529   Resp 16 03/06/25 1529   SpO2 93 % 03/06/25 1529       [facility] AN Post Evaluation:   Patient Evaluated in PACU  Patient Participation: complete - patient participated  Level of Consciousness: awake  Pain Score: 0  Pain Management: adequate  Airway Patency:patent  Dental exam unchanged from preop  Yes    Nausea/Vomiting: none  Cardiovascular Status: acceptable and hemodynamically stable  Respiratory Status: acceptable and room air  Postoperative Hydration stable      Teo Cuadra MD  3/6/2025 4:16 PM

## 2025-03-06 NOTE — ANESTHESIA PREPROCEDURE EVALUATION
Anesthesia PreOp Note    HPI:     Jaqueline Smith is a 45 year old female who presents for preoperative consultation requested by: Jarred Leos MD    Date of Surgery: 3/6/2025    Procedure(s):  Replacement and revision of subcutaneous port-a-catheter with reservoir  Indication: Port a catheter in place, problem with vascular abcess    Relevant Problems   No relevant active problems       NPO:  Last Liquid Consumption Date: 03/06/25 (water, black coffee)  Last Liquid Consumption Time: 0730  Last Solid Consumption Date: 03/05/25  Last Solid Consumption Time: 2100  Last Liquid Consumption Date: 03/06/25 (water, black coffee)          History Review:  Patient Active Problem List    Diagnosis Date Noted    Cervical spondylosis 08/25/2020    High risk medication use 03/08/2020    Arthralgia of both hands 03/08/2020    Foot pain, bilateral 03/08/2020    Trigeminal neuralgia 01/21/2020    Hyperopia of both eyes with astigmatism 09/21/2018    Binocular vision disorder with convergence insufficiency 09/21/2018    Sjogren's syndrome with keratoconjunctivitis sicca (HCC) 09/21/2018    Long-term use of Plaquenil 09/21/2018    Chronic laryngitis 07/18/2018    Positive FELICITY (antinuclear antibody) 05/31/2018    Constipation due to opioid therapy 11/02/2017    Other chronic pain 11/02/2017    Burning tongue 09/29/2017    Lupus (systemic lupus erythematosus) ([facility]) 01/26/2017    Neutropenia associated with autoimmune disease ([facility]) 08/03/2016    Hashimoto's thyroiditis 06/11/2015    SLE (systemic lupus erythematosus) ([facility]) 10/31/2014    Punctate keratitis of both eyes 07/11/2013    Dry eye syndrome 07/11/2013    Inflammatory polyarthritis ([facility]) 04/15/2013    Trigeminal neuralgia of left side of face 02/27/2012    Photosensitivity 12/15/2011    Livedo reticularis 12/15/2011    Raynaud's phenomenon without gangrene 12/15/2011    Abnormal laboratory test result 12/15/2011    Peripheral neuropathy 12/15/2011    Balance problem  12/15/2011       Past Medical History:    Anxiety state    Convergence insufficiency    Depression    Disorder of thyroid    Dry eye    Endometriosis    Hashimoto's thyroiditis    High blood pressure    History of positive PPD    Hypertension    per patient resolved/was medication induced    Lupus    Neuropathy    triigeminal neuropathy    Neurotrophic keratopathy of left eye    PONV (postoperative nausea and vomiting)    Trigeminal neuralgia    Visual impairment    glasses       Past Surgical History:   Procedure Laterality Date    Adenoidectomy      Cardiac catheterization  2014    Colonoscopy  2010    normal    Colonoscopy  2017    normal    Create eardrum opening,gen anesth      Egd N/A 07/19/2017    Procedure: ESOPHAGOGASTRODUODENOSCOPY, COLONOSCOPY, POSSIBLE BIOPSY, POSSIBLE POLYPECTOMY 84374, 37379;  Surgeon: Aashish Santiago MD;  Location: [facility]    Fluor gid ctr vad plmt rplcmt/rmvl N/A 02/03/2015    Procedure: INSERTION PORT-A-CATH;  Surgeon: Rome Lott MD;  Location: [facility]    Hysterectomy  04/2016    CARLOS only    Insertion, tunneled centrally inserted venous access device, w/subq port; >5 years N/A 02/03/2015    Procedure: INSERTION PORT-A-CATH;  Surgeon: Rome Lott MD;  Location: [facility]    Nasal scopy,open maxill sinus  03/19/2012    Procedure: ENDOSCOPY,  SINUS SEPTOPLASTY,ANTROSTOMIES;  Surgeon: Jude Starkey MD;  Location: [facility]    Nasal scopy,open maxill sinus  03/19/2012    Procedure: ENDOSCOPY,  SINUS SEPTOPLASTY,ANTROSTOMIES;  Surgeon: Jude Starkey MD;  Location: [facility]    Nasal scopy,remv part ethmoid  03/19/2012    Procedure: ENDOSCOPY,  SINUS SEPTOPLASTY,ANTROSTOMIES;  Surgeon: Jude Starkey MD;  Location: [facility]    Nasal scopy,remv part ethmoid  03/19/2012    Procedure: ENDOSCOPY,  SINUS SEPTOPLASTY,ANTROSTOMIES;  Surgeon: Jude Starkey MD;  [facility]    Other surgical history      gyne laparascopy x2    Other surgical history  2014    cardiac cath    Other surgical history  2017    EGD    Repair of nasal septum  03/19/2012    Procedure: ENDOSCOPY,  SINUS SEPTOPLASTY,ANTROSTOMIES;  Surgeon: Jude Starkey MD;  [facility]: [facility]    Sinus surgery        Tonsillectomy         Prescriptions Prior to Admission[1]  Current Medications and Prescriptions Ordered in Epic[2]    Allergies[3]    Family History   Problem Relation Age of Onset    Other (Other) Mother         h    Cancer Maternal Grandmother         lung    Other (Other) Paternal Grandmother         COPD    Macular degeneration Neg     Glaucoma Neg      Social History     Socioeconomic History    Marital status:    Tobacco Use    Smoking status: Former     Types: Cigarettes    Smokeless tobacco: Never    Tobacco comments:     quit smoking age 16, smoked for 1 yr   Vaping Use    Vaping status: Never Used   Substance and Sexual Activity    Alcohol use: Yes     Comment: socially    Drug use: No       Available pre-op labs reviewed.             Vital Signs:  Body mass index is 26.63 kg/m².   height is 1.676 m (5' 6\") and weight is 74.8 kg (165 lb).   Vitals:    03/04/25 1510   Weight: 74.8 kg (165 lb)   Height: 1.676 m (5' 6\")        Anesthesia Evaluation     Patient summary reviewed    History of anesthetic complications   Airway   Mallampati: II  TM distance: >3 FB  Neck ROM: full  Dental - Dentition appears grossly intact     Pulmonary - negative ROS    breath sounds clear to auscultation  Cardiovascular   Exercise tolerance: good  (+) hypertension    ECG reviewed  Rhythm: regular  Rate: normal    Neuro/Psych    (+)  neuromuscular disease, anxiety/panic attacks,  depression      GI/Hepatic/Renal - negative ROS     Endo/Other - negative ROS   (+) hypothyroidism  Abdominal  - normal exam                 Anesthesia Plan:   ASA:  2  Plan:   General  Airway:   ETT  Informed Consent Plan and Risks Discussed With:  Patient      I have informed Jaqueline Smith and/or legal guardian or family member of the nature of the anesthetic plan, benefits, risks including possible dental damage if relevant, major complications, and any alternative forms of anesthetic management.   All of the patient's questions were answered to the best of my ability. The patient desires the anesthetic management as planned.  Teo Cuadra MD  3/6/2025 12:25 PM  Present on Admission:  **None**           [1]   Medications Prior to Admission   Medication Sig Dispense Refill Last Dose/Taking    betamethasone dipropionate 0.05 % External Cream Apply 1 Application topically 2 (two) times daily as needed.   Taking As Needed    HYDROcodone-acetaminophen  MG Oral Tab Take 1 tablet by mouth every 8 (eight) hours as needed for Pain.   3/5/2025 at  6:00 PM    lisinopril 10 MG Oral Tab Take 1 tablet (10 mg total) by mouth daily.   3/5/2025 at  9:00 PM    Digestive Enzymes (DIGESTIVE ENZYME OR) Take 1 capsule by mouth daily.   3/5/2025 at  6:00 PM    pregabalin 200 MG Oral Cap Take 1 capsule (200 mg total) by mouth at bedtime.   3/5/2025 at  9:00 PM    ondansetron 4 MG Oral Tablet Dispersible Take 1 tablet (4 mg total) by mouth every 8 (eight) hours as needed for Nausea. 60 tablet 1 Past Month    levothyroxine 25 MCG Oral Tab Take 1 tablet (25 mcg total) by mouth daily. 90 tablet 3 3/6/2025 at  7:00 AM    hydroxychloroquine 200 MG Oral Tab TAKE 1 TABLET BY MOUTH  DAILY ALTERNATING WITH 2  TABLETS BY MOUTH DAILY (Patient taking differently: Take 1 tablet (200 mg total) by mouth 2 (two) times daily.) 135 tablet 3 3/5/2025 at  9:00 PM    escitalopram 5 MG Oral Tab Take 1 tablet (5 mg total) by mouth daily.   3/5/2025 at  8:00 AM    BELBUCA 450 MCG Buccal Film Place 1 Film inside cheek 2 (two) times daily.   3/6/2025 at  7:00 AM    cycloSPORINE (RESTASIS) 0.05 % Ophthalmic Emulsion Place 1 drop into both  eyes 2 (two) times daily. 180 each 3 Past Month    busPIRone HCl 15 MG Oral Tab Take 1 tablet (15 mg total) by mouth 2 (two) times daily.   3/5/2025 at  8:00 AM    escitalopram 20 MG Oral Tab Take 1 tablet (20 mg total) by mouth daily.   3/5/2025 at  8:00 AM    estradiol 1 MG Oral Tab Take 1 tablet (1 mg total) by mouth daily.   3/5/2025 at  6:00 PM    Cholecalciferol (VITAMIN D3) 5000 UNITS Oral Cap Take  by mouth.   Past Week    Omega-3 Fatty Acids (OMEGA 3 OR) 3000mg daily    Past Week   [2]   Current Facility-Administered Medications Ordered in Epic   Medication Dose Route Frequency Provider Last Rate Last Admin    lactated ringers infusion   Intravenous Continuous Jarred Leos MD        acetaminophen (Tylenol Extra Strength) tab 1,000 mg  1,000 mg Oral Once Jarred Leos MD        ceFAZolin (Ancef) 2g in 10mL IV syringe premix  2 g Intravenous Once Jarred Leos MD         No current [facility]-ordered outpatient medications on file.   [3]   Allergies  Allergen Reactions    Azathioprine HIVES    Lamotrigine RASH, ITCHING and OTHER (SEE COMMENTS)    Corn Syrup      Migraines & vomiting      Erythromycin DIARRHEA and NAUSEA AND VOMITING

## 2025-03-06 NOTE — INTERVAL H&P NOTE
Pre-op Diagnosis: Port a catheter in place, problem with vascular abcess    The above referenced H&P was reviewed by Jarred Leos MD on 3/6/2025, the patient was examined and no significant changes have occurred in the patient's condition since the H&P was performed.  I discussed with the patient and/or legal representative the potential benefits, risks and side effects of this procedure; the likelihood of the patient achieving goals; and potential problems that might occur during recuperation.  I discussed reasonable alternatives to the procedure, including risks, benefits and side effects related to the alternatives and risks related to not receiving this procedure.  We will proceed with procedure as planned.       157.48

## 2025-05-04 ENCOUNTER — APPOINTMENT (OUTPATIENT)
Dept: ULTRASOUND IMAGING | Facility: HOSPITAL | Age: 45
End: 2025-05-04
Attending: EMERGENCY MEDICINE
Payer: COMMERCIAL

## 2025-05-04 ENCOUNTER — HOSPITAL ENCOUNTER (EMERGENCY)
Facility: HOSPITAL | Age: 45
Discharge: HOME | End: 2025-05-04
Attending: EMERGENCY MEDICINE
Payer: COMMERCIAL

## 2025-05-04 VITALS
HEART RATE: 71 BPM | DIASTOLIC BLOOD PRESSURE: 75 MMHG | BODY MASS INDEX: 27 KG/M2 | TEMPERATURE: 99 F | OXYGEN SATURATION: 98 % | SYSTOLIC BLOOD PRESSURE: 114 MMHG | WEIGHT: 170 LBS | RESPIRATION RATE: 14 BRPM

## 2025-05-04 DIAGNOSIS — M79.662 PAIN OF LEFT CALF: Primary | ICD-10-CM

## 2025-05-04 PROCEDURE — 93971 EXTREMITY STUDY: CPT | Performed by: EMERGENCY MEDICINE

## 2025-05-04 PROCEDURE — 99284 EMERGENCY DEPT VISIT MOD MDM: CPT

## 2025-05-04 RX ORDER — IBUPROFEN 600 MG/1
600 TABLET, FILM COATED ORAL ONCE
Status: COMPLETED | OUTPATIENT
Start: 2025-05-04 | End: 2025-05-04

## 2025-05-04 RX ORDER — CEPHALEXIN 500 MG/1
500 CAPSULE ORAL ONCE
Status: COMPLETED | OUTPATIENT
Start: 2025-05-04 | End: 2025-05-04

## 2025-05-04 RX ORDER — CEPHALEXIN 500 MG/1
500 CAPSULE ORAL 4 TIMES DAILY
Qty: 28 CAPSULE | Refills: 0 | Status: SHIPPED | OUTPATIENT
Start: 2025-05-04 | End: 2025-05-11

## 2025-05-04 NOTE — ED PROVIDER NOTES
Patient Seen in: [facility] Emergency Department      History     Chief Complaint   Patient presents with    Pain    Redness     Stated Complaint: hx lupus. pain/red/warm left calf. denies thinner use. port put in march 6. den*    Subjective:   HPI    This is a 45-year-old female who presents with left calf redness, warmth numbness.  The patient denies presently any chest pain or shortness of breath or fevers or chills.  She had a port placed in March 6..  She had been recently evaluated had cardiac echoes and a Holter monitor as.        Summary:     1. Left ventricle: The cavity size is normal. Wall thickness is normal.      Systolic function is normal. The estimated ejection fraction is 65-70%.      Wall motion is normal; there are no regional wall motion abnormalities.      Left ventricular diastolic function parameters are normal.   2. Right ventricle: The cavity size is normal. Wall thickness is normal.      Systolic function is normal. Estimation of the right ventricular systolic      pressure is at the upper limits of normal. The RV pressure during systole      is 33mm Hg.   3. Left atrium: The atrium is normal in size.   4. Mitral valve: There is mild regurgitation.   5. Tricuspid valve: There is mild regurgitation.   6. Pericardium, extracardiac: There is no significant pericardial effusion.   Impressions:  No recent prior study for direct comparison.       History of Present Illness               Objective:     No pertinent past medical history.            No pertinent past surgical history.              No pertinent social history.                              Physical Exam     ED Triage Vitals [05/04/25 1235]   /71   Pulse 87   Resp 20   Temp 98.5 °F (36.9 °C)   Temp src Temporal   SpO2 97 %   O2 Device None (Room air)       Current Vitals:   Vital Signs  BP: 125/83  Pulse: 85  Resp: 16  Temp: 98.5 °F (36.9 °C)  Temp src: Temporal  MAP (mmHg): 96    Oxygen Therapy  SpO2: 98 %  O2 Device:  None (Room air)        Physical Exam  General: .  Patient is a pleasant female in no respiratory distress.  The patient is in no respiratory distress    HEENT: Atraumatic, conjunctiva are not pale.  There is no icterus.  Oral mucosa Is wet.  No facial trauma.  The neck is supple.    LUNGS: Clear to auscultation, there is no wheezing or retraction.  No crackles.    CV: Cardiovascular is regular without murmurs or rubs.    ABD: The abdomen is soft nondistended nontender.  There is no rebound.  There is no guarding.    EXT: There is good pulses bilaterally.  There is no calf tenderness.  There is no rash noted.  There is no edema  The left calf there is tenderness and redness at 1 spot on her left calf.  There is good pulses.  No bony tenderness is noted.  The patient has good pulses patient is otherwise neurovascular intact.  NEURO: Alert and oriented x4.  Muscle strength and sensory exam is grossly normal.  And the patient is neurologically intact with no focal findings.                  ED Course     Labs Reviewed - No data to display         Results     I personally reviewed the radiographs and my individual interpretation shows  No obvious DVT seen.      Also reviewed official report and it shows  US VENOUS DOPPLER LEG LEFT - DIAG IMG (CPT=93971)  Result Date: 5/4/2025  PROCEDURE:  US VENOUS DOPPLER LEG LEFT - DIAG IMG (CPT=93971)  COMPARISON:  None.  INDICATIONS:  Eval for DVT.  Swelling.  TECHNIQUE:  Real time, grey scale, and duplex ultrasound was used to evaluate the lower extremity venous system. B-mode two-dimensional images of the vascular structures, Doppler spectral analysis, and color flow.  Doppler imaging were performed.  The following veins were imaged:  Common, deep, and superficial femoral, popliteal, sapheno-femoral junction, posterior tibial veins, and the contralateral common femoral vein.  PATIENT STATED HISTORY: (As transcribed by Technologist)     FINDINGS:  Compression demonstrated of the  common femoral, superficial femoral and popliteal venous segments.  Observed phasicity and augmentation.  Flow demonstrated in the posterior tibial veins.                CONCLUSION:  Negative DVT study.   LOCATION:  [facility]    Dictated by (CST): Mehran Min MD on 5/04/2025 at 2:21 PM     Finalized by (CST): Mehran Min MD on 5/04/2025 at 2:22 PM                  The patient's left leg is little red I do feel the most likely is an early cellulitis or could be a phlebitis I discussed that may be reasonable to have him follow-up with his doctor and started on oral antibiotics.  The patient has no complaints of chest pain shortness of breath I discussed we do further workup they feel comfortable without it she is not febrile does not look ill or septic there is no fluctuance or crepitance is just an abscess they feel comfortable going home she is neurologically intact no good pulses sensory exam is normal.  Recommended close follow-up with the primary care physician return if increasing pain or discomfort.    MDM      I discussed with the patient that were seeing them  in a short period of time.  I discussed with them that there is always a possibility that things can change and a need reevaluation with their primary care physician as soon as possible.  I've also discussed with them that if the pain gets worse to return to the emergency room immediately.  Discussed the things to return here including increasing pain discomfort fevers or chills to return to the emergency room.        Medical Decision Making      Disposition and Plan     Clinical Impression:  1. Pain of left calf         Disposition:  Discharge  5/4/2025  2:46 pm    Follow-up:  Elke Asif MD  [address] [address]  [phone]    Follow up in 2 day(s)            Medications Prescribed:  Current Discharge Medication List        START taking these medications    Details   cephALEXin 500 MG Oral Cap Take 1 capsule (500 mg total)  by mouth 4 (four) times daily for 7 days.  Qty: 28 capsule, Refills: 0             Supplementary Documentation:

## 2025-05-04 NOTE — DISCHARGE INSTRUCTIONS
Take Motrin, Tylenol, take the antibiotics.  Follow-up with your primary care physician if you have any chest pain or shortness of breath you need to return here if you have continued symptoms follow-up for repeat ultrasound.    You were seen in the emergency room in a limited time.  There is a possibility that although we do not see any acute process at this present time that things can change with time.  Is therefore imperative that you follow-up with primary care physician for close follow-up.  If there is any significant progression of your pain  or other symptoms you to return immediately to the emergency room.

## 2025-05-04 NOTE — ED QUICK NOTES
Pt states Friday night she developed what felt like a charley horse in left calf. Next day it was sore she put cream/lotion with massage no relief. Today she woke and left lower leg is red and swollen to back of calf. 2+ bilat pedal pulses. No other c/o. No SOB.

## 2025-06-04 ENCOUNTER — BEHAVIORAL HEALTH (OUTPATIENT)
Age: 45
End: 2025-06-04

## 2025-06-04 DIAGNOSIS — F33.0 MAJOR DEPRESSIVE DISORDER, RECURRENT EPISODE, MILD (CMD): Primary | ICD-10-CM

## 2025-06-04 PROCEDURE — 99214 OFFICE O/P EST MOD 30 MIN: CPT | Performed by: NURSE PRACTITIONER

## 2025-06-04 RX ORDER — TRAZODONE HYDROCHLORIDE 50 MG/1
TABLET ORAL
Qty: 60 TABLET | Refills: 1 | Status: SHIPPED | OUTPATIENT
Start: 2025-06-04

## 2025-06-04 RX ORDER — DEXTROAMPHETAMINE SACCHARATE, AMPHETAMINE ASPARTATE, DEXTROAMPHETAMINE SULFATE AND AMPHETAMINE SULFATE 1.25; 1.25; 1.25; 1.25 MG/1; MG/1; MG/1; MG/1
5 TABLET ORAL DAILY
Qty: 30 TABLET | Refills: 0 | Status: SHIPPED | OUTPATIENT
Start: 2025-06-04

## 2025-06-04 RX ORDER — BUPROPION HYDROCHLORIDE 300 MG/1
300 TABLET ORAL DAILY
Qty: 90 TABLET | Refills: 1 | Status: SHIPPED | OUTPATIENT
Start: 2025-06-04

## 2025-06-05 ENCOUNTER — TELEPHONE (OUTPATIENT)
Age: 45
End: 2025-06-05

## 2025-06-07 ENCOUNTER — E-ADVICE (OUTPATIENT)
Age: 45
End: 2025-06-07

## 2025-06-09 RX ORDER — ESCITALOPRAM OXALATE 5 MG/1
TABLET ORAL
Qty: 14 TABLET | Refills: 0 | Status: SHIPPED | OUTPATIENT
Start: 2025-06-09 | End: 2025-06-11 | Stop reason: SDUPTHER

## 2025-06-09 RX ORDER — ESCITALOPRAM OXALATE 20 MG/1
20 TABLET ORAL DAILY
Qty: 14 TABLET | Refills: 0 | Status: SHIPPED | OUTPATIENT
Start: 2025-06-09 | End: 2025-06-11 | Stop reason: SDUPTHER

## 2025-06-10 ENCOUNTER — TELEPHONE (OUTPATIENT)
Age: 45
End: 2025-06-10

## 2025-06-11 RX ORDER — ESCITALOPRAM OXALATE 20 MG/1
20 TABLET ORAL DAILY
Qty: 90 TABLET | Refills: 0 | Status: SHIPPED | OUTPATIENT
Start: 2025-06-11

## 2025-06-11 RX ORDER — ESCITALOPRAM OXALATE 5 MG/1
TABLET ORAL
Qty: 90 TABLET | Refills: 0 | Status: SHIPPED | OUTPATIENT
Start: 2025-06-11

## 2025-07-14 ENCOUNTER — E-ADVICE (OUTPATIENT)
Age: 45
End: 2025-07-14

## 2025-07-15 RX ORDER — DEXTROAMPHETAMINE SACCHARATE, AMPHETAMINE ASPARTATE, DEXTROAMPHETAMINE SULFATE AND AMPHETAMINE SULFATE 1.25; 1.25; 1.25; 1.25 MG/1; MG/1; MG/1; MG/1
5 TABLET ORAL DAILY
Qty: 30 TABLET | Refills: 0 | Status: SHIPPED | OUTPATIENT
Start: 2025-07-15

## 2025-08-22 ENCOUNTER — BEHAVIORAL HEALTH (OUTPATIENT)
Age: 45
End: 2025-08-22

## 2025-08-22 DIAGNOSIS — F33.1 MAJOR DEPRESSIVE DISORDER, RECURRENT EPISODE, MODERATE (CMD): Primary | ICD-10-CM

## 2025-08-22 RX ORDER — BUPROPION HYDROCHLORIDE 300 MG/1
300 TABLET ORAL DAILY
Qty: 90 TABLET | Refills: 1 | Status: SHIPPED | OUTPATIENT
Start: 2025-08-22

## 2025-08-22 RX ORDER — ESCITALOPRAM OXALATE 20 MG/1
20 TABLET ORAL DAILY
Qty: 90 TABLET | Refills: 1 | Status: SHIPPED | OUTPATIENT
Start: 2025-08-22

## 2025-08-22 RX ORDER — DEXTROAMPHETAMINE SACCHARATE, AMPHETAMINE ASPARTATE, DEXTROAMPHETAMINE SULFATE AND AMPHETAMINE SULFATE 1.25; 1.25; 1.25; 1.25 MG/1; MG/1; MG/1; MG/1
5 TABLET ORAL DAILY
Qty: 30 TABLET | Refills: 0 | Status: SHIPPED | OUTPATIENT
Start: 2025-08-22

## 2025-08-22 RX ORDER — HYDROXYZINE HYDROCHLORIDE 50 MG/1
TABLET, FILM COATED ORAL
Qty: 60 TABLET | Refills: 3 | Status: SHIPPED | OUTPATIENT
Start: 2025-08-22

## 2025-08-22 RX ORDER — ESCITALOPRAM OXALATE 5 MG/1
TABLET ORAL
Qty: 90 TABLET | Refills: 1 | Status: SHIPPED | OUTPATIENT
Start: 2025-08-22

## 2025-08-25 ENCOUNTER — TELEPHONE (OUTPATIENT)
Age: 45
End: 2025-08-25

## (undated) DEVICE — SUT PROL 2-0 30IN SH NABSRB BLU L26MM 1/2 CIR

## (undated) DEVICE — ANTIBACTERIAL UNDYED BRAIDED (POLYGLACTIN 910), SYNTHETIC ABSORBABLE SUTURE: Brand: COATED VICRYL

## (undated) DEVICE — UNDYED BRAIDED (POLYGLACTIN 910), SYNTHETIC ABSORBABLE SUTURE: Brand: COATED VICRYL

## (undated) DEVICE — 3M™ IOBAN™ 2 ANTIMICROBIAL INCISE DRAPE 6640EZ: Brand: IOBAN™ 2

## (undated) DEVICE — INTENDED FOR TISSUE SEPARATION, AND OTHER PROCEDURES THAT REQUIRE A SHARP SURGICAL BLADE TO PUNCTURE OR CUT.: Brand: BARD-PARKER ® STAINLESS STEEL BLADES

## (undated) DEVICE — SUT PERMA- 2-0 18IN NABSRB BLK TIE SILK

## (undated) DEVICE — .038IN ANG TIP BX 5 ZIPWIRE

## (undated) DEVICE — 12 ML SYRINGE LUER-LOCK TIP: Brand: MONOJECT

## (undated) DEVICE — 3M™ IOBAN™ 2 ANTIMICROBIAL INCISE DRAPE 6650EZ: Brand: IOBAN™ 2

## (undated) DEVICE — SOLUTION IRRIG 1000ML 0.9% NACL USP BTL

## (undated) DEVICE — DRAPE,T,LAPARO,TRANS,STERILE: Brand: MEDLINE

## (undated) DEVICE — MINOR GENERAL: Brand: MEDLINE INDUSTRIES, INC.

## (undated) DEVICE — C-ARM: Brand: UNBRANDED

## (undated) DEVICE — SYRINGE MED 10ML LL TIP W/O SFTY DISP

## (undated) DEVICE — GLOVE SUR 7.5 SENSICARE PI PIP CRM PWD F